# Patient Record
Sex: FEMALE | Race: WHITE | NOT HISPANIC OR LATINO | URBAN - METROPOLITAN AREA
[De-identification: names, ages, dates, MRNs, and addresses within clinical notes are randomized per-mention and may not be internally consistent; named-entity substitution may affect disease eponyms.]

---

## 2018-12-05 ENCOUNTER — OFFICE VISIT (OUTPATIENT)
Dept: SURGERY | Facility: CLINIC | Age: 63
End: 2018-12-05
Payer: COMMERCIAL

## 2018-12-05 VITALS
DIASTOLIC BLOOD PRESSURE: 60 MMHG | WEIGHT: 125 LBS | BODY MASS INDEX: 20.83 KG/M2 | HEIGHT: 65 IN | SYSTOLIC BLOOD PRESSURE: 110 MMHG

## 2018-12-05 DIAGNOSIS — C21.0 ANAL CANCER (CMS/HCC): Primary | ICD-10-CM

## 2018-12-05 PROCEDURE — 99204 OFFICE O/P NEW MOD 45 MIN: CPT | Performed by: SURGERY

## 2018-12-05 RX ORDER — ATOVAQUONE AND PROGUANIL HYDROCHLORIDE 250; 100 MG/1; MG/1
1 TABLET, FILM COATED ORAL
COMMUNITY
Start: 2018-12-02 | End: 2019-06-11

## 2018-12-05 RX ORDER — FLUOXETINE HYDROCHLORIDE 40 MG/1
40 CAPSULE ORAL EVERY MORNING
COMMUNITY

## 2018-12-05 RX ORDER — LORAZEPAM 0.5 MG/1
0.5 TABLET ORAL AS NEEDED
COMMUNITY
Start: 2016-04-26

## 2018-12-05 RX ORDER — ZOLPIDEM TARTRATE 10 MG/1
10 TABLET ORAL AS NEEDED
COMMUNITY
Start: 2016-06-15

## 2018-12-05 RX ORDER — ALBUTEROL SULFATE 90 UG/1
INHALANT RESPIRATORY (INHALATION) AS NEEDED
COMMUNITY
Start: 2016-09-12

## 2018-12-05 RX ORDER — ESTRADIOL 0.5 MG/1
0.5 TABLET ORAL DAILY
COMMUNITY

## 2018-12-05 RX ORDER — TRETINOIN 0.25 MG/G
CREAM TOPICAL
COMMUNITY
Start: 2018-07-09

## 2018-12-05 RX ORDER — AZELASTINE 1 MG/ML
SPRAY, METERED NASAL AS NEEDED
COMMUNITY
Start: 2017-02-15 | End: 2019-06-11

## 2018-12-05 NOTE — LETTER
2018     Cabrera Alonso DO  [address]  [facility] [address]  [facility] [id number]    Patient: Zuleika Barrera   YOB: 1955   Date of Visit: 2018       Dear Dr. Alonso:    Thank you for referring Zuleika Barrera to me for evaluation. Below are my notes for this consultation.    If you have questions, please do not hesitate to call me. I look forward to following your patient along with you.         Sincerely,        Aamir Reeves MD        CC: MD Lala Merino Philip Y, MD  2018  9:48 AM  Sign at close encounter      Chief Complaint:   Chief Complaint   Patient presents with   • Consult     anal cancer   .    HPI     Patient is a 63 y.o. 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     Medical History:   Past Medical History:   Diagnosis Date   • Anal cancer (CMS/HCC) (HCC)        Surgical History:   Past Surgical History:   Procedure Laterality Date   •  SECTION     • HYSTERECTOMY     • TOTAL HIP ARTHROPLASTY         Social History:   Social History     Social History   • Marital status: Single     Spouse name: N/A   • Number of children: N/A   • Years of education: N/A     Occupational History   • Not on file.     Social History Main Topics   • Smoking  status: Never Smoker   • Smokeless tobacco: Never Used   • Alcohol use Yes   • Drug use: No   • Sexual activity: Defer     Other Topics Concern   • Not on file     Social History Narrative   • No narrative on file       Family History:   History reviewed. No pertinent family history.    Allergies:   Patient has no known allergies.    Current Medications:      Current Outpatient Prescriptions:   •  albuterol HFA (PROAIR HFA) 90 mcg/actuation inhaler, INHALE 2 PUFFS EVERY 4 HOURS, Disp: , Rfl:   •  atovaquone-proguanil (MALARONE) 250-100 mg tablet, Take 1 tablet by mouth., Disp: , Rfl:   •  azelastine (ASTELIN) 137 mcg (0.1 %) nasal spray, USE 2 SPRAYS IN EACH NOSTRIL 2 TIMES DAILY AS NEEDED, Disp: , Rfl:   •  estradiol (ESTRACE) 1 mg tablet, Take 0.5 mg by mouth daily., Disp: , Rfl:   •  FLUoxetine (PROzac) 40 mg capsule, Take 40 mg by mouth daily., Disp: , Rfl:   •  LORazepam (ATIVAN) 0.5 mg tablet, , Disp: , Rfl:   •  tretinoin (RETIN-A) 0.025 % cream, APPLY TO CLEAN DRY FACE NIGHTLY, Disp: , Rfl:   •  zolpidem (AMBIEN) 10 mg tablet, , Disp: , Rfl:     Review of Systems  All 14 systems reviewed and the findings are not pertinent to the current problem.    Objective     Vital Signs  Vitals:    12/05/18 0821   BP: 110/60     Body mass index is 20.8 kg/m².      Physicial Exam  General Appearance:  Well developed.  Well nourished.  In no acute distress. Patient was not obese.  Head:  Posture of the head was normal.  Neck:  External features of the neck was normal.  Trachea:  Showed no abnormalities.  Trachea midline.  Extraocular Movements:  Normal.  Pupils:  Reactive to light.   Not deformed.  Oral Cavity:  Mixed dentition was not observed.  Tongue was midline.  Peripheral edema:  Not present.  Genitalia:  Normal.    Anorectal Examination:    No pilonidal sinus was observed.   No pilonidal cyst was observed.   Perianal skin was not erythematous.  No perianal wart was observed.  No anal fissure was observed.   Anus did  not have a fistula.   Anal sphincter tone was normal.   No hemorrhoids were seen.   No external anal skin tags were observed.    Rectum:  No rectal abscess was observed.   Rectal prolapse was not observed.   No rectal fistula was observed.   Rectal exam was not tender.   No rectal fluctuance was observed.  Rectal exam showed no mass.   Normal devon-anal skin with no lesions or dermatitis    She had a firm ulcerated mass in the posterior anal canal that extended to the distal anal verge - rectal exam was not painful but the mass is about 4cm long and occupies 30-40% of the anal canal.      Other Exam:  Musculoskeletal System:  No gross defecits or defects of the upper or lower extremities.  No cyanosis of the fingers.  Lumbar / Lumbosacral Spine:  Lumbar/lumbosacral spine exhibited no abnormalities.  Bilateral thighs:   Posterior aspect was not swollen.   Posterior aspect showed no induration.   Posterior aspect was not erythematous.   Posterior aspect was not warm.   No mass on the posterior aspect.  Functional Exam:   General/bilateral:  Mobility was not limited.  Neurological:   No confusion was observed.   No disorientation was observed.  Speech:  Normal.  Motor:  No tremor was seen.  Balance:  Normal.  Gait And Stance:  Normal.  Psychiatric:  Mood:  Euthymic.  Affect:  Normal.  Skin:  No clubbing of the fingernails.  Normal upper and lower extremity skin exam.      Problem List Items Addressed This Visit     Anal cancer (CMS/HCC) (HCC) - Primary     This nice lady has a brand-new diagnosis of anal squamous cell carcinoma that was found several weeks ago during a colonoscopy.  On exam she has a hard mass in the posterior anus which is clearly invading the muscles.  She is symptomatic and has pain and bleeding.  My plan is a referral to oncology for radiation and chemotherapy and I will see her after the termination of her treatment so that I can reevaluate the anal canal and the rectum in the operating room about  3 months from termination of treatment.                   Aamir Reeves MD 12/5/2018 9:47 AM

## 2018-12-05 NOTE — PATIENT INSTRUCTIONS
Anal Cancer      Overview  Anal cancer is an uncommon type of cancer that occurs in the anal canal. The anal canal is a short tube at the end of your rectum through which stool leaves your body.  Anal cancer can cause signs and symptoms such as rectal bleeding and anal pain.  Most people with anal cancer are treated with a combination of chemotherapy and radiation. Though combining anal cancer treatments increases the chance of a cure, the combined treatments also increase the risk of side effects.    Signs and symptoms  - Bleeding from the anus or rectum  - Pain in the area of the anus  - A mass or growth in the anal canal  - Anal itching    Relationship to HPV  Anal cancer is closely related to a sexually transmitted infection called human papillomavirus (HPV). Evidence of HPV is detected in the majority of anal cancers. HPV is thought to be the most common cause of anal cancers.    Risk Factors  - Older age. Most cases of anal cancer occur in people age 50 and older.  - Many sexual partners. Men and women who have many sexual partners over their lifetimes have a greater risk of anal cancer.  - Smoking. Smoking cigarettes may increase your risk of anal cancer.  - Human papillomavirus (HPV). HPV infection increases your risk of several cancers, including anal cancer and cervical cancer. HPV infection is a sexually transmitted infection that can also cause genital warts.  - Drugs or conditions that suppress your immune system. People who take drugs to suppress their immune systems (immunosuppressive drugs), including people who have received organ transplants, may have an increased risk of anal cancer. HIV -- the virus that causes AIDS -- suppresses the immune system and increases the risk of anal cancer.    Metastatic Spread  Anal cancer rarely spreads (metastasizes) to distant parts of the body. Only a small percentage of tumors are found to have spread, but those that do are especially difficult to treat. Anal  cancer that metastasizes most commonly spreads to the liver and the lungs.    Treatment  What treatment you receive for anal cancer depends on the stage of your cancer, your overall health and your own preferences.    If your anal cancer is very small, it may be removed using surgery alone, however doctors usually treat anal cancer with a combination of chemotherapy and radiation. Combined, these two treatments enhance each other and improve chances for a cure.    Chemotherapy drugs are injected into a vein or taken as pills. The chemicals travel throughout your body, killing rapidly growing cells, such as cancer cells. Unfortunately they also damage healthy cells that grow rapidly, including those in your gastrointestinal tract and in your hair follicles. This causes side effects such as nausea, vomiting and hair loss.    Radiation therapy uses high-powered beams, such as X-rays, to kill cancer cells. During radiation therapy, you're positioned on a table and a large machine moves around you, directing radiation beams to specific areas of your body in order to target your cancer. Radiation may damage healthy tissue near where the beams are aimed. Side effects may include skin redness and sores in and around your anus, as well as hardening and shrinking of your anal canal.    You typically undergo radiation therapy for anal cancer for five or six weeks. Chemotherapy is typically administered during the first week and the fifth week. Your doctor tailors your treatment schedule based on characteristics of your cancer and your overall health. Though combining chemotherapy and radiation increases the effectiveness of the two treatments, it also makes side effects more likely.

## 2018-12-05 NOTE — PROGRESS NOTES
Chief Complaint:   Chief Complaint   Patient presents with   • Consult     anal cancer   .    HPI     Patient is a 63 y.o. 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 and she wanted another opinion    She also had pain back there - was painful to ride bike - pt has never had anal sex -     Pt had hysterectomy 11 years ago - done for uterine and bladder prolapse -     Turns out mass is 4-5 cm long and 2-3 cm wide - this was done 18 -     CT from last week shows abdomen / pelvis with no mets - needs chest CT probably    Never had a positive PAP smear -     .     Medical History:   Past Medical History:   Diagnosis Date   • Anal cancer (CMS/HCC) (HCC)        Surgical History:   Past Surgical History:   Procedure Laterality Date   •  SECTION     • HYSTERECTOMY     • TOTAL HIP ARTHROPLASTY         Social History:   Social History     Social History   • Marital status: Single     Spouse name: N/A   • Number of children: N/A   • Years of education: N/A     Occupational History   • Not on file.     Social History Main Topics   • Smoking status: Never Smoker   • Smokeless tobacco: Never Used   • Alcohol use Yes   • Drug use: No   • Sexual activity: Defer     Other Topics Concern   • Not on file     Social History Narrative   • No narrative on file       Family History:   History reviewed. No pertinent family history.    Allergies:   Patient has no known allergies.    Current Medications:      Current Outpatient Prescriptions:   •  albuterol HFA (PROAIR HFA) 90 mcg/actuation inhaler, INHALE 2 PUFFS EVERY 4 HOURS, Disp: , Rfl:   •  atovaquone-proguanil (MALARONE) 250-100 mg tablet, Take 1 tablet by  mouth., Disp: , Rfl:   •  azelastine (ASTELIN) 137 mcg (0.1 %) nasal spray, USE 2 SPRAYS IN EACH NOSTRIL 2 TIMES DAILY AS NEEDED, Disp: , Rfl:   •  estradiol (ESTRACE) 1 mg tablet, Take 0.5 mg by mouth daily., Disp: , Rfl:   •  FLUoxetine (PROzac) 40 mg capsule, Take 40 mg by mouth daily., Disp: , Rfl:   •  LORazepam (ATIVAN) 0.5 mg tablet, , Disp: , Rfl:   •  tretinoin (RETIN-A) 0.025 % cream, APPLY TO CLEAN DRY FACE NIGHTLY, Disp: , Rfl:   •  zolpidem (AMBIEN) 10 mg tablet, , Disp: , Rfl:     Review of Systems  All 14 systems reviewed and the findings are not pertinent to the current problem.    Objective     Vital Signs  Vitals:    12/05/18 0821   BP: 110/60     Body mass index is 20.8 kg/m².      Physicial Exam  General Appearance:  Well developed.  Well nourished.  In no acute distress. Patient was not obese.  Head:  Posture of the head was normal.  Neck:  External features of the neck was normal.  Trachea:  Showed no abnormalities.  Trachea midline.  Extraocular Movements:  Normal.  Pupils:  Reactive to light.   Not deformed.  Oral Cavity:  Mixed dentition was not observed.  Tongue was midline.  Peripheral edema:  Not present.  Genitalia:  Normal.    Anorectal Examination:    No pilonidal sinus was observed.   No pilonidal cyst was observed.   Perianal skin was not erythematous.  No perianal wart was observed.  No anal fissure was observed.   Anus did not have a fistula.   Anal sphincter tone was normal.   No hemorrhoids were seen.   No external anal skin tags were observed.    Rectum:  No rectal abscess was observed.   Rectal prolapse was not observed.   No rectal fistula was observed.   Rectal exam was not tender.   No rectal fluctuance was observed.  Rectal exam showed no mass.   Normal devon-anal skin with no lesions or dermatitis    She had a firm ulcerated mass in the posterior anal canal that extended to the distal anal verge - rectal exam was not painful but the mass is about 4cm long and occupies  30-40% of the anal canal.      Other Exam:  Musculoskeletal System:  No gross defecits or defects of the upper or lower extremities.  No cyanosis of the fingers.  Lumbar / Lumbosacral Spine:  Lumbar/lumbosacral spine exhibited no abnormalities.  Bilateral thighs:   Posterior aspect was not swollen.   Posterior aspect showed no induration.   Posterior aspect was not erythematous.   Posterior aspect was not warm.   No mass on the posterior aspect.  Functional Exam:   General/bilateral:  Mobility was not limited.  Neurological:   No confusion was observed.   No disorientation was observed.  Speech:  Normal.  Motor:  No tremor was seen.  Balance:  Normal.  Gait And Stance:  Normal.  Psychiatric:  Mood:  Euthymic.  Affect:  Normal.  Skin:  No clubbing of the fingernails.  Normal upper and lower extremity skin exam.      Problem List Items Addressed This Visit     Anal cancer (CMS/HCC) (HCC) - Primary     This nice lady has a brand-new diagnosis of anal squamous cell carcinoma that was found several weeks ago during a colonoscopy.  On exam she has a hard mass in the posterior anus which is clearly invading the muscles.  She is symptomatic and has pain and bleeding.  My plan is a referral to oncology for radiation and chemotherapy and I will see her after the termination of her treatment so that I can reevaluate the anal canal and the rectum in the operating room about 3 months from termination of treatment.                   Aamir Reeves MD 12/5/2018 9:47 AM

## 2018-12-05 NOTE — ASSESSMENT & PLAN NOTE
This nice lady has a brand-new diagnosis of anal squamous cell carcinoma that was found several weeks ago during a colonoscopy.  On exam she has a hard mass in the posterior anus which is clearly invading the muscles.  She is symptomatic and has pain and bleeding.  My plan is a referral to oncology for radiation and chemotherapy and I will see her after the termination of her treatment so that I can reevaluate the anal canal and the rectum in the operating room about 3 months from termination of treatment.

## 2018-12-06 ENCOUNTER — LAB REQUISITION (OUTPATIENT)
Dept: LAB | Facility: HOSPITAL | Age: 63
End: 2018-12-06
Payer: COMMERCIAL

## 2018-12-06 DIAGNOSIS — C21.0 MALIGNANT NEOPLASM OF ANUS (CMS/HCC): ICD-10-CM

## 2018-12-06 LAB
BASOPHILS # BLD: 0.03 K/UL (ref 0.01–0.1)
BASOPHILS NFR BLD: 0.7 %
DIFFERENTIAL METHOD BLD: ABNORMAL
EOSINOPHIL # BLD: 0.03 K/UL (ref 0.04–0.36)
EOSINOPHIL NFR BLD: 0.7 %
ERYTHROCYTE [DISTWIDTH] IN BLOOD BY AUTOMATED COUNT: 11.6 % (ref 11.7–14.4)
HCT VFR BLDCO AUTO: 34.9 %
HGB BLD-MCNC: 11.7 G/DL
IMM GRANULOCYTES # BLD AUTO: 0.01 K/UL (ref 0–0.08)
IMM GRANULOCYTES NFR BLD AUTO: 0.2 %
LYMPHOCYTES # BLD: 1.69 K/UL (ref 1.2–3.5)
LYMPHOCYTES NFR BLD: 41.1 %
MCH RBC QN AUTO: 31.6 PG (ref 28–33.2)
MCHC RBC AUTO-ENTMCNC: 33.5 G/DL (ref 32.2–35.5)
MCV RBC AUTO: 94.3 FL (ref 83–98)
MONOCYTES # BLD: 0.29 K/UL (ref 0.28–0.8)
MONOCYTES NFR BLD: 7.1 %
NEUTROPHILS # BLD: 2.06 K/UL (ref 1.7–7)
NEUTS SEG NFR BLD: 50.2 %
NRBC BLD-RTO: 0 %
PDW BLD AUTO: 9.8 FL (ref 9.4–12.3)
PLATELET # BLD AUTO: 259 K/UL
RBC # BLD AUTO: 3.7 M/UL (ref 3.93–5.22)
WBC # BLD AUTO: 4.11 K/UL

## 2018-12-06 PROCEDURE — 36415 COLL VENOUS BLD VENIPUNCTURE: CPT | Performed by: INTERNAL MEDICINE

## 2018-12-06 PROCEDURE — 85025 COMPLETE CBC W/AUTO DIFF WBC: CPT | Performed by: INTERNAL MEDICINE

## 2018-12-07 ENCOUNTER — TRANSCRIBE ORDERS (OUTPATIENT)
Dept: SCHEDULING | Age: 63
End: 2018-12-07

## 2018-12-07 DIAGNOSIS — C21.0 MALIGNANT NEOPLASM OF ANUS (CMS/HCC): Primary | ICD-10-CM

## 2018-12-19 ENCOUNTER — HOSPITAL ENCOUNTER (OUTPATIENT)
Dept: RADIOLOGY | Age: 63
Discharge: HOME | End: 2018-12-19
Attending: INTERNAL MEDICINE
Payer: COMMERCIAL

## 2018-12-19 VITALS — WEIGHT: 127 LBS | BODY MASS INDEX: 21.16 KG/M2 | HEIGHT: 65 IN

## 2018-12-19 DIAGNOSIS — C21.0 MALIGNANT NEOPLASM OF ANUS (CMS/HCC): ICD-10-CM

## 2018-12-19 PROCEDURE — 63600105 HC IODINE BASED CONTRAST: Mod: JW | Performed by: INTERNAL MEDICINE

## 2018-12-19 PROCEDURE — 71260 CT THORAX DX C+: CPT

## 2018-12-19 RX ORDER — FLUDEOXYGLUCOSE F18 300 MCI/ML
9.44 INJECTION INTRAVENOUS ONCE
Status: COMPLETED | OUTPATIENT
Start: 2018-12-19 | End: 2018-12-19

## 2018-12-19 RX ADMIN — FLUDEOXYGLUCOSE F18 9.44 MILLICURIE: 300 INJECTION INTRAVENOUS at 07:37

## 2018-12-19 RX ADMIN — IOHEXOL 75 ML: 350 INJECTION, SOLUTION INTRAVENOUS at 10:44

## 2019-05-07 ENCOUNTER — OFFICE VISIT (OUTPATIENT)
Dept: SURGERY | Facility: CLINIC | Age: 64
End: 2019-05-07
Payer: COMMERCIAL

## 2019-05-07 VITALS
HEIGHT: 65 IN | DIASTOLIC BLOOD PRESSURE: 70 MMHG | SYSTOLIC BLOOD PRESSURE: 120 MMHG | WEIGHT: 125 LBS | BODY MASS INDEX: 20.83 KG/M2

## 2019-05-07 DIAGNOSIS — C21.0 ANAL CANCER (CMS/HCC): Primary | ICD-10-CM

## 2019-05-07 PROCEDURE — 99213 OFFICE O/P EST LOW 20 MIN: CPT | Performed by: SURGERY

## 2019-05-07 RX ORDER — TRIAMCINOLONE ACETONIDE 55 UG/1
2 SPRAY, METERED NASAL AS NEEDED
COMMUNITY
Start: 2015-05-13

## 2019-05-07 RX ORDER — SODIUM CHLORIDE 9 MG/ML
INJECTION, SOLUTION INTRAVENOUS CONTINUOUS
Status: CANCELLED | OUTPATIENT
Start: 2019-05-07 | End: 2019-05-08

## 2019-05-07 NOTE — PROGRESS NOTES
Chief Complaint:   Chief Complaint   Patient presents with   • Follow-up     Anal Cancer   .    HPI     Patient is a 63 y.o. female who presents with the following:  Pt has 4cm posterior anal mass found on scope  - I saw her  as well and examined her in the office - had rads / chemo for this porrly diff anal squamous CA -     Dr. Ruiz - rads  Dr. Kaiser - chemo    5 weeks of RX -     Radiation was rough - RX ended last week of 2019  - .     Medical History:   Past Medical History:   Diagnosis Date   • Anal cancer (CMS/HCC) (HCC)        Surgical History:   Past Surgical History:   Procedure Laterality Date   •  SECTION     • HYSTERECTOMY     • TOTAL HIP ARTHROPLASTY         Social History:   Social History     Social History   • Marital status: Single     Spouse name: N/A   • Number of children: N/A   • Years of education: N/A     Occupational History   • Not on file.     Social History Main Topics   • Smoking status: Never Smoker   • Smokeless tobacco: Never Used   • Alcohol use Yes   • Drug use: No   • Sexual activity: Defer     Other Topics Concern   • Not on file     Social History Narrative   • No narrative on file       Family History:   History reviewed. No pertinent family history.    Allergies:   Hydromorphone    Current Medications:      Current Outpatient Prescriptions:   •  albuterol HFA (PROAIR HFA) 90 mcg/actuation inhaler, as needed. , Disp: , Rfl:   •  azelastine (ASTELIN) 137 mcg (0.1 %) nasal spray, as needed. , Disp: , Rfl:   •  estradiol (ESTRACE) 1 mg tablet, Take 0.5 mg by mouth daily., Disp: , Rfl:   •  FLUoxetine (PROzac) 40 mg capsule, Take 40 mg by mouth daily., Disp: , Rfl:   •  ibuprofen (MOTRIN) 200 mg tablet, Take 200 mg by mouth as needed., Disp: , Rfl:   •  LORazepam (ATIVAN) 0.5 mg tablet, as needed. , Disp: , Rfl:   •  tretinoin (RETIN-A) 0.025 % cream, APPLY TO CLEAN DRY FACE NIGHTLY, Disp: , Rfl:   •  triamcinolone (NASACORT) 55 mcg nasal inhaler, 2  sprays daily., Disp: , Rfl:   •  zolpidem (AMBIEN) 10 mg tablet, as needed. , Disp: , Rfl:   •  atovaquone-proguanil (MALARONE) 250-100 mg tablet, Take 1 tablet by mouth., Disp: , Rfl:     Review of Systems  All 14 systems reviewed and the findings are not pertinent to the current problem.    Objective     Vital Signs  Vitals:    05/07/19 1029   BP: 120/70     Body mass index is 20.8 kg/m².      Physicial Exam    General Appearance:    Well developed.  Well nourished.  In no acute distress.  Patient was not observed to be obese.  Head:  Posture of the head was normal.  Neck:  External appearance of the neck was normal.  Trachea:  Showed no abnormalities.  Trachea was midline.  Eyes:  Extraocular movements were normal.  Pupils:  Reactive to light.  Not deformed.  Oral Cavity:  Mixed dentition was not observed.  Tongue was midline.    Other:  Musculoskeletal System:  No deficits or defects in the upper or lower extremities.    Functional Exam:   General/bilateral: Mobility was not limited.  Neurological:  No confusion was observed.  No disorientation was observed.  Speech: Normal.  Motor: No tremor was seen.  Balance: Normal.  Gait And Stance: Normal.  Psychiatric:  Mood:  Euthymic.  Affect:  Normal.  Skin:  General appearance was normal.  No jaundice.  Nails:  No clubbing of the fingernails.        Problem List Items Addressed This Visit     Anal cancer (CMS/HCC) (HCC) - Primary     The patient completed her radiation and chemo for anal cancer at the end of March 2019.  My plan is a trip to the OR in June 2019 for an exam with possible biopsies of the area.                   Aamir Reeves MD 5/7/2019 10:59 AM

## 2019-05-07 NOTE — ASSESSMENT & PLAN NOTE
The patient completed her radiation and chemo for anal cancer at the end of March 2019.  My plan is a trip to the OR in June 2019 for an exam with possible biopsies of the area.

## 2019-05-07 NOTE — LETTER
May 7, 2019     Cabrera Alonso DO  [address]  [facility] [address]  [place] [id number]    Patient: Zuleika Barrera   YOB: 1955   Date of Visit: 2019       Dear Dr. Alonso:    Thank you for referring Zuleika Barrera to me for evaluation. Below are my notes for this consultation.    If you have questions, please do not hesitate to call me. I look forward to following your patient along with you.         Sincerely,        Aamir Reeves MD        CC: MD Mihir Pratt MD Pearson, Philip Y, MD  2019 10:59 AM  Sign at close encounter      Chief Complaint:   Chief Complaint   Patient presents with   • Follow-up     Anal Cancer   .    HPI     Patient is a 63 y.o. female who presents with the following:  Pt has 4cm posterior anal mass found on scope  - I saw her  as well and examined her in the office - had rads / chemo for this porrly diff anal squamous CA -     Dr. Ruiz - rads  Dr. Kaiser - chemo    5 weeks of RX -     Radiation was rough - RX ended last week of 2019  - .     Medical History:   Past Medical History:   Diagnosis Date   • Anal cancer (CMS/HCC) (HCC)        Surgical History:   Past Surgical History:   Procedure Laterality Date   •  SECTION     • HYSTERECTOMY     • TOTAL HIP ARTHROPLASTY         Social History:   Social History     Social History   • Marital status: Single     Spouse name: N/A   • Number of children: N/A   • Years of education: N/A     Occupational History   • Not on file.     Social History Main Topics   • Smoking status: Never Smoker   • Smokeless tobacco: Never Used   • Alcohol use Yes   • Drug use: No   • Sexual activity: Defer     Other Topics Concern   • Not on file     Social History Narrative   • No narrative on file       Family History:   History reviewed. No pertinent family history.    Allergies:   Hydromorphone    Current Medications:      Current Outpatient Prescriptions:   •  albuterol HFA (PROAIR  HFA) 90 mcg/actuation inhaler, as needed. , Disp: , Rfl:   •  azelastine (ASTELIN) 137 mcg (0.1 %) nasal spray, as needed. , Disp: , Rfl:   •  estradiol (ESTRACE) 1 mg tablet, Take 0.5 mg by mouth daily., Disp: , Rfl:   •  FLUoxetine (PROzac) 40 mg capsule, Take 40 mg by mouth daily., Disp: , Rfl:   •  ibuprofen (MOTRIN) 200 mg tablet, Take 200 mg by mouth as needed., Disp: , Rfl:   •  LORazepam (ATIVAN) 0.5 mg tablet, as needed. , Disp: , Rfl:   •  tretinoin (RETIN-A) 0.025 % cream, APPLY TO CLEAN DRY FACE NIGHTLY, Disp: , Rfl:   •  triamcinolone (NASACORT) 55 mcg nasal inhaler, 2 sprays daily., Disp: , Rfl:   •  zolpidem (AMBIEN) 10 mg tablet, as needed. , Disp: , Rfl:   •  atovaquone-proguanil (MALARONE) 250-100 mg tablet, Take 1 tablet by mouth., Disp: , Rfl:     Review of Systems  All 14 systems reviewed and the findings are not pertinent to the current problem.    Objective     Vital Signs  Vitals:    05/07/19 1029   BP: 120/70     Body mass index is 20.8 kg/m².      Physicial Exam    General Appearance:    Well developed.  Well nourished.  In no acute distress.  Patient was not observed to be obese.  Head:  Posture of the head was normal.  Neck:  External appearance of the neck was normal.  Trachea:  Showed no abnormalities.  Trachea was midline.  Eyes:  Extraocular movements were normal.  Pupils:  Reactive to light.  Not deformed.  Oral Cavity:  Mixed dentition was not observed.  Tongue was midline.    Other:  Musculoskeletal System:  No deficits or defects in the upper or lower extremities.    Functional Exam:   General/bilateral: Mobility was not limited.  Neurological:  No confusion was observed.  No disorientation was observed.  Speech: Normal.  Motor: No tremor was seen.  Balance: Normal.  Gait And Stance: Normal.  Psychiatric:  Mood:  Euthymic.  Affect:  Normal.  Skin:  General appearance was normal.  No jaundice.  Nails:  No clubbing of the fingernails.        Problem List Items Addressed This Visit      Anal cancer (CMS/HCC) (HCC) - Primary     The patient completed her radiation and chemo for anal cancer at the end of March 2019.  My plan is a trip to the OR in June 2019 for an exam with possible biopsies of the area.                   Aamir Reeves MD 5/7/2019 10:59 AM

## 2019-06-10 PROCEDURE — 200200 PR NO CHARGE: Performed by: SURGERY

## 2019-06-10 NOTE — H&P
Chief Complaint:   Chief Complaint   Patient presents with   • Follow-up       Anal Cancer   .        HPI        Patient is a 63 y.o. female who presents with the following:  Pt has 4cm posterior anal mass found on scope  - I saw her  as well and examined her in the office - had rads / chemo for this porrly diff anal squamous CA -      Dr. Ruiz - rads  Dr. Kaiser - chemo     5 weeks of RX -      Radiation was rough - RX ended last week of 2019  - .      Medical History:   Medical History        Past Medical History:   Diagnosis Date   • Anal cancer (CMS/HCC) (HCC)              Surgical History:   Surgical History         Past Surgical History:   Procedure Laterality Date   •  SECTION      • HYSTERECTOMY       • TOTAL HIP ARTHROPLASTY                Social History:   Social History   Social History            Social History   • Marital status: Single       Spouse name: N/A   • Number of children: N/A   • Years of education: N/A          Occupational History   • Not on file.           Social History Main Topics   • Smoking status: Never Smoker   • Smokeless tobacco: Never Used   • Alcohol use Yes   • Drug use: No   • Sexual activity: Defer           Other Topics Concern   • Not on file          Social History Narrative   • No narrative on file            Family History:   History reviewed. No pertinent family history.     Allergies:   Hydromorphone     Current Medications:       Current Outpatient Prescriptions:   •  albuterol HFA (PROAIR HFA) 90 mcg/actuation inhaler, as needed. , Disp: , Rfl:   •  azelastine (ASTELIN) 137 mcg (0.1 %) nasal spray, as needed. , Disp: , Rfl:   •  estradiol (ESTRACE) 1 mg tablet, Take 0.5 mg by mouth daily., Disp: , Rfl:   •  FLUoxetine (PROzac) 40 mg capsule, Take 40 mg by mouth daily., Disp: , Rfl:   •  ibuprofen (MOTRIN) 200 mg tablet, Take 200 mg by mouth as needed., Disp: , Rfl:   •  LORazepam (ATIVAN) 0.5 mg tablet, as needed. , Disp: , Rfl:   •   tretinoin (RETIN-A) 0.025 % cream, APPLY TO CLEAN DRY FACE NIGHTLY, Disp: , Rfl:   •  triamcinolone (NASACORT) 55 mcg nasal inhaler, 2 sprays daily., Disp: , Rfl:   •  zolpidem (AMBIEN) 10 mg tablet, as needed. , Disp: , Rfl:   •  atovaquone-proguanil (MALARONE) 250-100 mg tablet, Take 1 tablet by mouth., Disp: , Rfl:      Review of Systems  All 14 systems reviewed and the findings are not pertinent to the current problem.        Objective         Vital Signs      Vitals:     05/07/19 1029   BP: 120/70      Body mass index is 20.8 kg/m².        Physicial Exam     General Appearance:    Well developed.  Well nourished.  In no acute distress.  Patient was not observed to be obese.  Head:  Posture of the head was normal.  Neck:  External appearance of the neck was normal.  Trachea:  Showed no abnormalities.  Trachea was midline.  Eyes:  Extraocular movements were normal.  Pupils:  Reactive to light.  Not deformed.  Oral Cavity:  Mixed dentition was not observed.  Tongue was midline.     Other:  Musculoskeletal System:  No deficits or defects in the upper or lower extremities.    Functional Exam:   General/bilateral: Mobility was not limited.  Neurological:  No confusion was observed.  No disorientation was observed.  Speech: Normal.  Motor: No tremor was seen.  Balance: Normal.  Gait And Stance: Normal.  Psychiatric:  Mood:  Euthymic.  Affect:  Normal.  Skin:  General appearance was normal.  No jaundice.  Nails:  No clubbing of the fingernails.               Problem List Items Addressed This Visit      Anal cancer (CMS/HCC) (HCC) - Primary       The patient completed her radiation and chemo for anal cancer at the end of March 2019.  My plan is a trip to the OR in June 2019 for an exam with possible biopsies of the area.

## 2019-06-11 ENCOUNTER — APPOINTMENT (OUTPATIENT)
Dept: LAB | Facility: HOSPITAL | Age: 64
End: 2019-06-11
Attending: SURGERY
Payer: COMMERCIAL

## 2019-06-11 ENCOUNTER — HOSPITAL ENCOUNTER (OUTPATIENT)
Dept: CARDIOLOGY | Facility: HOSPITAL | Age: 64
Discharge: HOME | End: 2019-06-11
Attending: SURGERY
Payer: COMMERCIAL

## 2019-06-11 DIAGNOSIS — C21.0 ANAL CANCER (CMS/HCC): ICD-10-CM

## 2019-06-11 LAB
ANION GAP SERPL CALC-SCNC: 7 MEQ/L (ref 3–15)
ATRIAL RATE: 60
BASOPHILS # BLD: 0.02 K/UL (ref 0.01–0.1)
BASOPHILS NFR BLD: 1 %
BUN SERPL-MCNC: 14 MG/DL (ref 8–20)
CALCIUM SERPL-MCNC: 9.4 MG/DL (ref 8.9–10.3)
CHLORIDE SERPL-SCNC: 103 MEQ/L (ref 98–109)
CO2 SERPL-SCNC: 28 MEQ/L (ref 22–32)
CREAT SERPL-MCNC: 0.9 MG/DL
DACRYOCYTES BLD QL SMEAR: ABNORMAL
DIFFERENTIAL METHOD BLD: ABNORMAL
EOSINOPHIL # BLD: 0.05 K/UL (ref 0.04–0.36)
EOSINOPHIL NFR BLD: 3 %
ERYTHROCYTE [DISTWIDTH] IN BLOOD BY AUTOMATED COUNT: 12 % (ref 11.7–14.4)
GFR SERPL CREATININE-BSD FRML MDRD: >60 ML/MIN/1.73M*2
GLUCOSE SERPL-MCNC: 91 MG/DL (ref 70–99)
HCT VFR BLDCO AUTO: 35.9 %
HGB BLD-MCNC: 11.7 G/DL
LYMPHOCYTES # BLD: 0.39 K/UL (ref 1.2–3.5)
LYMPHOCYTES NFR BLD: 22 %
MCH RBC QN AUTO: 32.3 PG (ref 28–33.2)
MCHC RBC AUTO-ENTMCNC: 32.6 G/DL (ref 32.2–35.5)
MCV RBC AUTO: 99.2 FL (ref 83–98)
MONOCYTES # BLD: 0.16 K/UL (ref 0.28–0.8)
MONOCYTES NFR BLD: 9 %
NEUTS BAND # BLD: 0.04 K/UL (ref 0–0.53)
NEUTS BAND # BLD: 1.1 K/UL (ref 1.7–7)
NEUTS BAND NFR BLD: 2 %
NEUTS SEG NFR BLD: 63 %
P AXIS: 74
PDW BLD AUTO: 10.2 FL (ref 9.4–12.3)
PLAT MORPH BLD: NORMAL
PLATELET # BLD AUTO: 172 K/UL
PLATELET # BLD EST: ABNORMAL 10*3/UL
POTASSIUM SERPL-SCNC: 3.9 MEQ/L (ref 3.6–5.1)
PR INTERVAL: 128
QRS DURATION: 90
QT INTERVAL: 416
QTC CALCULATION(BAZETT): 416
R AXIS: 51
RBC # BLD AUTO: 3.62 M/UL (ref 3.93–5.22)
SODIUM SERPL-SCNC: 138 MEQ/L (ref 136–144)
T WAVE AXIS: 68
VENTRICULAR RATE: 60
WBC # BLD AUTO: 1.75 K/UL

## 2019-06-11 PROCEDURE — 85025 COMPLETE CBC W/AUTO DIFF WBC: CPT

## 2019-06-11 PROCEDURE — 80048 BASIC METABOLIC PNL TOTAL CA: CPT

## 2019-06-11 PROCEDURE — 36415 COLL VENOUS BLD VENIPUNCTURE: CPT

## 2019-06-11 PROCEDURE — 93005 ELECTROCARDIOGRAM TRACING: CPT

## 2019-06-12 LAB — PATH REV BLD -IMP: NORMAL

## 2019-06-14 ENCOUNTER — HOSPITAL ENCOUNTER (OUTPATIENT)
Facility: HOSPITAL | Age: 64
Setting detail: HOSPITAL OUTPATIENT SURGERY
Discharge: HOME | End: 2019-06-14
Attending: SURGERY | Admitting: SURGERY
Payer: COMMERCIAL

## 2019-06-14 ENCOUNTER — ANESTHESIA EVENT (OUTPATIENT)
Dept: OPERATING ROOM | Facility: HOSPITAL | Age: 64
Setting detail: HOSPITAL OUTPATIENT SURGERY
End: 2019-06-14
Payer: COMMERCIAL

## 2019-06-14 VITALS
TEMPERATURE: 98.5 F | OXYGEN SATURATION: 100 % | BODY MASS INDEX: 20.83 KG/M2 | WEIGHT: 125 LBS | DIASTOLIC BLOOD PRESSURE: 64 MMHG | RESPIRATION RATE: 14 BRPM | SYSTOLIC BLOOD PRESSURE: 106 MMHG | HEART RATE: 68 BPM | HEIGHT: 65 IN

## 2019-06-14 DIAGNOSIS — C21.0 ANAL CANCER (CMS/HCC): ICD-10-CM

## 2019-06-14 PROCEDURE — 36000003 HC OR LEVEL 3 INITIAL 30MIN: Performed by: SURGERY

## 2019-06-14 PROCEDURE — 0DBQ7ZX EXCISION OF ANUS, VIA NATURAL OR ARTIFICIAL OPENING, DIAGNOSTIC: ICD-10-PCS | Performed by: SURGERY

## 2019-06-14 PROCEDURE — 27200000 HC STERILE SUPPLY: Performed by: SURGERY

## 2019-06-14 PROCEDURE — 88304 TISSUE EXAM BY PATHOLOGIST: CPT | Performed by: SURGERY

## 2019-06-14 PROCEDURE — 63600000 HC DRUGS/DETAIL CODE: Performed by: ANESTHESIOLOGY

## 2019-06-14 PROCEDURE — 63700000 HC SELF-ADMINISTRABLE DRUG: Performed by: ANESTHESIOLOGY

## 2019-06-14 PROCEDURE — 25800000 HC PHARMACY IV SOLUTIONS: Performed by: SURGERY

## 2019-06-14 PROCEDURE — 46922 EXCISION OF ANAL LESION(S): CPT | Performed by: SURGERY

## 2019-06-14 PROCEDURE — 25000000 HC PHARMACY GENERAL: Performed by: SURGERY

## 2019-06-14 PROCEDURE — 71000002 HC PACU PHASE 2 INITIAL 30MIN: Performed by: SURGERY

## 2019-06-14 PROCEDURE — 37000001 HC ANESTHESIA GENERAL: Performed by: SURGERY

## 2019-06-14 PROCEDURE — 63700000 HC SELF-ADMINISTRABLE DRUG: Performed by: SURGERY

## 2019-06-14 PROCEDURE — 71000001 HC PACU PHASE 1 INITIAL 30MIN: Performed by: SURGERY

## 2019-06-14 PROCEDURE — 71000011 HC PACU PHASE 1 EA ADDL MIN: Performed by: SURGERY

## 2019-06-14 RX ORDER — MORPHINE SULFATE 4 MG/ML
1-2 INJECTION, SOLUTION INTRAMUSCULAR; INTRAVENOUS
Status: DISCONTINUED | OUTPATIENT
Start: 2019-06-14 | End: 2019-06-14 | Stop reason: HOSPADM

## 2019-06-14 RX ORDER — OXYCODONE HYDROCHLORIDE 5 MG/1
5-10 TABLET ORAL EVERY 4 HOURS PRN
Status: DISCONTINUED | OUTPATIENT
Start: 2019-06-14 | End: 2019-06-14 | Stop reason: HOSPADM

## 2019-06-14 RX ORDER — SODIUM CHLORIDE 9 MG/ML
INJECTION, SOLUTION INTRAVENOUS CONTINUOUS
Status: DISCONTINUED | OUTPATIENT
Start: 2019-06-14 | End: 2019-06-14 | Stop reason: HOSPADM

## 2019-06-14 RX ORDER — DEXAMETHASONE SODIUM PHOSPHATE 4 MG/ML
INJECTION, SOLUTION INTRA-ARTICULAR; INTRALESIONAL; INTRAMUSCULAR; INTRAVENOUS; SOFT TISSUE AS NEEDED
Status: DISCONTINUED | OUTPATIENT
Start: 2019-06-14 | End: 2019-06-14 | Stop reason: SURG

## 2019-06-14 RX ORDER — BUPIVACAINE HYDROCHLORIDE AND EPINEPHRINE 2.5; 5 MG/ML; UG/ML
INJECTION, SOLUTION EPIDURAL; INFILTRATION; INTRACAUDAL; PERINEURAL AS NEEDED
Status: DISCONTINUED | OUTPATIENT
Start: 2019-06-14 | End: 2019-06-14 | Stop reason: HOSPADM

## 2019-06-14 RX ORDER — SODIUM CHLORIDE 9 MG/ML
1000 INJECTION, SOLUTION INTRAVENOUS CONTINUOUS
Status: DISCONTINUED | OUTPATIENT
Start: 2019-06-14 | End: 2019-06-14 | Stop reason: HOSPADM

## 2019-06-14 RX ORDER — PROPOFOL 10 MG/ML
INJECTION, EMULSION INTRAVENOUS AS NEEDED
Status: DISCONTINUED | OUTPATIENT
Start: 2019-06-14 | End: 2019-06-14 | Stop reason: SURG

## 2019-06-14 RX ORDER — KETOROLAC TROMETHAMINE 30 MG/ML
INJECTION, SOLUTION INTRAMUSCULAR; INTRAVENOUS AS NEEDED
Status: DISCONTINUED | OUTPATIENT
Start: 2019-06-14 | End: 2019-06-14 | Stop reason: SURG

## 2019-06-14 RX ORDER — ONDANSETRON HYDROCHLORIDE 2 MG/ML
4 INJECTION, SOLUTION INTRAVENOUS
Status: DISCONTINUED | OUTPATIENT
Start: 2019-06-14 | End: 2019-06-14 | Stop reason: HOSPADM

## 2019-06-14 RX ORDER — PROPOFOL 10 MG/ML
INJECTION, EMULSION INTRAVENOUS CONTINUOUS PRN
Status: DISCONTINUED | OUTPATIENT
Start: 2019-06-14 | End: 2019-06-14

## 2019-06-14 RX ORDER — FENTANYL CITRATE 50 UG/ML
50 INJECTION, SOLUTION INTRAMUSCULAR; INTRAVENOUS
Status: DISCONTINUED | OUTPATIENT
Start: 2019-06-14 | End: 2019-06-14 | Stop reason: HOSPADM

## 2019-06-14 RX ORDER — ONDANSETRON HYDROCHLORIDE 2 MG/ML
INJECTION, SOLUTION INTRAVENOUS AS NEEDED
Status: DISCONTINUED | OUTPATIENT
Start: 2019-06-14 | End: 2019-06-14 | Stop reason: SURG

## 2019-06-14 RX ORDER — MEPERIDINE HYDROCHLORIDE 25 MG/ML
12.5 INJECTION INTRAMUSCULAR; INTRAVENOUS; SUBCUTANEOUS EVERY 10 MIN PRN
Status: DISCONTINUED | OUTPATIENT
Start: 2019-06-14 | End: 2019-06-14 | Stop reason: HOSPADM

## 2019-06-14 RX ORDER — FENTANYL CITRATE 50 UG/ML
INJECTION, SOLUTION INTRAMUSCULAR; INTRAVENOUS AS NEEDED
Status: DISCONTINUED | OUTPATIENT
Start: 2019-06-14 | End: 2019-06-14 | Stop reason: SURG

## 2019-06-14 RX ORDER — DIPHENHYDRAMINE HCL 50 MG/ML
12.5 VIAL (ML) INJECTION
Status: DISCONTINUED | OUTPATIENT
Start: 2019-06-14 | End: 2019-06-14 | Stop reason: HOSPADM

## 2019-06-14 RX ORDER — SCOPOLAMINE 1 MG/3D
1 PATCH, EXTENDED RELEASE TRANSDERMAL ONCE
Status: DISCONTINUED | OUTPATIENT
Start: 2019-06-14 | End: 2019-06-14 | Stop reason: HOSPADM

## 2019-06-14 RX ORDER — SCOPOLAMINE 1 MG/3D
PATCH, EXTENDED RELEASE TRANSDERMAL
Status: DISCONTINUED
Start: 2019-06-14 | End: 2019-06-14 | Stop reason: HOSPADM

## 2019-06-14 RX ORDER — DEXTROSE 40 %
15-30 GEL (GRAM) ORAL AS NEEDED
Status: DISCONTINUED | OUTPATIENT
Start: 2019-06-14 | End: 2019-06-14 | Stop reason: HOSPADM

## 2019-06-14 RX ORDER — IBUPROFEN 200 MG
16-32 TABLET ORAL AS NEEDED
Status: DISCONTINUED | OUTPATIENT
Start: 2019-06-14 | End: 2019-06-14 | Stop reason: HOSPADM

## 2019-06-14 RX ORDER — ACETAMINOPHEN 325 MG/1
650 TABLET ORAL EVERY 4 HOURS PRN
Status: DISCONTINUED | OUTPATIENT
Start: 2019-06-14 | End: 2019-06-14 | Stop reason: HOSPADM

## 2019-06-14 RX ORDER — MIDAZOLAM HYDROCHLORIDE 2 MG/2ML
INJECTION, SOLUTION INTRAMUSCULAR; INTRAVENOUS AS NEEDED
Status: DISCONTINUED | OUTPATIENT
Start: 2019-06-14 | End: 2019-06-14 | Stop reason: SURG

## 2019-06-14 RX ORDER — DEXTROSE 50 % IN WATER (D50W) INTRAVENOUS SYRINGE
25 AS NEEDED
Status: DISCONTINUED | OUTPATIENT
Start: 2019-06-14 | End: 2019-06-14 | Stop reason: HOSPADM

## 2019-06-14 RX ORDER — LIDOCAINE HYDROCHLORIDE 10 MG/ML
INJECTION, SOLUTION INFILTRATION; PERINEURAL AS NEEDED
Status: DISCONTINUED | OUTPATIENT
Start: 2019-06-14 | End: 2019-06-14 | Stop reason: HOSPADM

## 2019-06-14 RX ORDER — BACITRACIN 500 UNIT/G
OINTMENT (GRAM) TOPICAL AS NEEDED
Status: DISCONTINUED | OUTPATIENT
Start: 2019-06-14 | End: 2019-06-14 | Stop reason: HOSPADM

## 2019-06-14 RX ADMIN — KETOROLAC TROMETHAMINE 30 MG: 30 INJECTION, SOLUTION INTRAMUSCULAR at 13:09

## 2019-06-14 RX ADMIN — FENTANYL CITRATE 25 MCG: 50 INJECTION, SOLUTION INTRAMUSCULAR; INTRAVENOUS at 13:04

## 2019-06-14 RX ADMIN — FENTANYL CITRATE 25 MCG: 50 INJECTION, SOLUTION INTRAMUSCULAR; INTRAVENOUS at 12:47

## 2019-06-14 RX ADMIN — SCOPALAMINE 1 PATCH: 1 PATCH, EXTENDED RELEASE TRANSDERMAL at 10:59

## 2019-06-14 RX ADMIN — SODIUM CHLORIDE: 9 INJECTION, SOLUTION INTRAVENOUS at 11:00

## 2019-06-14 RX ADMIN — FENTANYL CITRATE 25 MCG: 50 INJECTION, SOLUTION INTRAMUSCULAR; INTRAVENOUS at 12:58

## 2019-06-14 RX ADMIN — MIDAZOLAM HYDROCHLORIDE 2 MG: 1 INJECTION, SOLUTION INTRAMUSCULAR; INTRAVENOUS at 12:43

## 2019-06-14 RX ADMIN — ONDANSETRON 4 MG: 2 INJECTION INTRAMUSCULAR; INTRAVENOUS at 12:54

## 2019-06-14 RX ADMIN — PROPOFOL 50 MG: 10 INJECTION, EMULSION INTRAVENOUS at 12:50

## 2019-06-14 RX ADMIN — PROPOFOL 50 MG: 10 INJECTION, EMULSION INTRAVENOUS at 12:57

## 2019-06-14 RX ADMIN — DEXAMETHASONE SODIUM PHOSPHATE 4 MG: 4 INJECTION, SOLUTION INTRAMUSCULAR; INTRAVENOUS at 12:54

## 2019-06-14 RX ADMIN — PROPOFOL 50 MCG/KG/MIN: 10 INJECTION, EMULSION INTRAVENOUS at 12:54

## 2019-06-14 ASSESSMENT — PAIN - FUNCTIONAL ASSESSMENT: PAIN_FUNCTIONAL_ASSESSMENT: NO/DENIES PAIN

## 2019-06-14 NOTE — DISCHARGE INSTRUCTIONS
"You had a biopsy of the distal anal canal under anesthesia today.    Any pain should be controlled using IBU or Tylenol.    You can use topical Lidocaine over the counter for surface pain - Recticare is a good brand.    There is a tiny open wound which may ooze a bit.  Place a gauze there if you need it.    Avoid constipation.    Please come see me in 4 months for another routine checkup    Please call me in one week for the biopsy results.  I highly doubt the \"nodule\" had anything to do with cancer.    Call my office with any questions.    [phone]  "

## 2019-06-14 NOTE — ANESTHESIA POSTPROCEDURE EVALUATION
Patient: Zuleika Barrera    Procedure Summary     Date:  06/14/19 Room / Location:  [facility] OR  / [facility] OR [facility]    Anesthesia Start:  1246 Anesthesia Stop:  1313    Procedure:  EUa, possible biopsy anal canal (N/A ) Diagnosis:       Anal cancer (CMS/HCC) (HCC)      (Anal cancer (CMS/HCC) (HCC) [C21.0])      ()      (** CARDIAC CLEARANCE DR. ROSS )    Surgeon:  Aamir Reeves MD Responsible Provider:  Katherine Doll MD    Anesthesia Type:  general ASA Status:  2          Anesthesia Type: general  PACU Vitals  6/14/2019 1310 - 6/14/2019 1401      6/14/2019 1316 6/14/2019 1320 6/14/2019 1330 6/14/2019 1340    BP: - (!)  97/59 102/62 102/63    Temp: 37.1 °C (98.7 °F) - - -    Pulse: - 71 68 72    Resp: - 16 (!)  11 14    SpO2: - 99 % 100 % 100 %              6/14/2019 1350             BP: 105/65       Temp: 36.8 °C (98.2 °F)       Pulse: 66       Resp: 13       SpO2: 100 %               Anesthesia Post Evaluation    Pain management: adequate  Patient location during evaluation: PACU  Patient participation: complete - patient participated  Level of consciousness: awake and alert  Cardiovascular status: acceptable  Airway Patency: adequate  Respiratory status: acceptable  Hydration status: acceptable  Anesthetic complications: no

## 2019-06-14 NOTE — ANESTHESIA PREPROCEDURE EVALUATION
Anesthesia ROS/MED HX    Anesthesia History    History of anesthetic complications  Pulmonary    asthma  Cardiovascular  Comments: Reported intact per pt, implants, short chin  Hematological    anemia  Musculoskeletal   Osteoarthritis   Arthritis  Endo/Other  Body Habitus: Normal  ROS/MED HX Comments:    GI/Hepatic/Renal: Anal cancer s/p chemotherapy and radiation   Hematological: Leukopenia      Past Surgical History:   Procedure Laterality Date   •  SECTION     • COLONOSCOPY     • COSMETIC SURGERY      abdominoplasty   • HIP ARTHROSCOPY Left    • HYSTERECTOMY      prolapse   • IMPLANT      1 upper left dental implant   • JOINT REPLACEMENT Left about     hip   • SHOULDER ARTHROSCOPY Right    • WISDOM TOOTH EXTRACTION         Physical Exam    Airway   Mallampati: II   TM distance: >3 FB   Neck ROM: full  Cardiovascular - normal   Rhythm: regular   Rate: normal  Pulmonary - normal   clear to auscultation  Other Findings   Reported intact per pt, implants, short chin        Anesthesia Plan    Plan: general    Technique: total IV anesthesia     Lines and Monitors: PIV     Airway: natural airway / supplemental oxygen   ASA 2  Blood Products:   Use of Blood Products Discussed: No   Anesthetic plan and risks discussed with: patient  Induction:    intravenous   Postop Plan:   Patient Disposition: phase II then home   Pain Management: IV analgesics

## 2019-06-17 LAB
CASE RPRT: NORMAL
CLINICAL INFO: NORMAL
PATH REPORT.FINAL DX SPEC: NORMAL
PATH REPORT.GROSS SPEC: NORMAL

## 2019-10-15 ENCOUNTER — OFFICE VISIT (OUTPATIENT)
Dept: SURGERY | Facility: CLINIC | Age: 64
End: 2019-10-15
Payer: COMMERCIAL

## 2019-10-15 VITALS
BODY MASS INDEX: 20.83 KG/M2 | HEIGHT: 65 IN | SYSTOLIC BLOOD PRESSURE: 100 MMHG | DIASTOLIC BLOOD PRESSURE: 60 MMHG | WEIGHT: 125 LBS

## 2019-10-15 DIAGNOSIS — C21.0 ANAL CANCER (CMS/HCC): Primary | ICD-10-CM

## 2019-10-15 PROCEDURE — 99213 OFFICE O/P EST LOW 20 MIN: CPT | Performed by: SURGERY

## 2019-10-15 RX ORDER — AZELASTINE 1 MG/ML
SPRAY, METERED NASAL
COMMUNITY

## 2019-10-15 RX ORDER — ESTRADIOL 0.1 MG/G
CREAM VAGINAL
COMMUNITY
Start: 2019-03-29

## 2019-10-15 RX ORDER — IBUPROFEN 200 MG
TABLET ORAL DAILY PRN
COMMUNITY

## 2019-10-15 NOTE — LETTER
October 15, 2019     Cabrera Alonso DO  [address]  [facility] [address]  [facility] [address]    Patient: Zuleika Barrera  YOB: 1955  Date of Visit: 10/15/2019      Dear Dr. Alonso:    Thank you for referring Zuleika Barrera to me for evaluation. Below are my notes for this consultation.    If you have questions, please do not hesitate to call me. I look forward to following your patient along with you.         Sincerely,        Aamir Reeves MD        CC: No Recipients  Aamir Reeves MD  10/15/2019  3:32 PM  Sign at close encounter      Chief Complaint:   Chief Complaint   Patient presents with   • Follow-up   .    HPI     Patient is a 64 y.o. female who presents with the following:  Pt feels abd bloating and has discharge and some rectal bleeding -     Had anal CA - found in  and final OR bx  - nothing left in  -     This is our 4 mo checkup -     Not bleed every day - can be on paper or in bowl - also mucousy - pt has chronic abd distention - no pain with BM -     Mass used to b posteriior  - 30% lumen -     Medical History:   Past Medical History:   Diagnosis Date   • Anal cancer (CMS/HCC)     radiation and chemotx -last 2019; having GI bloating issues, difficult to determine if cause is dietary or from tx   • Anemia    • Arthritis    • Asthma     exercise induced   • PONV (postoperative nausea and vomiting)        Surgical History:   Past Surgical History:   Procedure Laterality Date   •  SECTION     • COLONOSCOPY     • COSMETIC SURGERY      abdominoplasty   • HIP ARTHROSCOPY Left    • HYSTERECTOMY      prolapse   • IMPLANT      1 upper left dental implant   • JOINT REPLACEMENT Left about     hip   • SHOULDER ARTHROSCOPY Right    • WISDOM TOOTH EXTRACTION         Social History:   Social History     Socioeconomic History   • Marital status: Single     Spouse name: Not on file   • Number of children: Not on file   • Years of education: Not on  file   • Highest education level: Not on file   Occupational History   • Not on file   Social Needs   • Financial resource strain: Not on file   • Food insecurity:     Worry: Not on file     Inability: Not on file   • Transportation needs:     Medical: Not on file     Non-medical: Not on file   Tobacco Use   • Smoking status: Never Smoker   • Smokeless tobacco: Never Used   Substance and Sexual Activity   • Alcohol use: Yes     Alcohol/week: 5.0 standard drinks     Types: 5 Glasses of wine per week   • Drug use: No   • Sexual activity: Defer   Lifestyle   • Physical activity:     Days per week: Not on file     Minutes per session: Not on file   • Stress: Not on file   Relationships   • Social connections:     Talks on phone: Not on file     Gets together: Not on file     Attends Yazidism service: Not on file     Active member of club or organization: Not on file     Attends meetings of clubs or organizations: Not on file     Relationship status: Not on file   • Intimate partner violence:     Fear of current or ex partner: Not on file     Emotionally abused: Not on file     Physically abused: Not on file     Forced sexual activity: Not on file   Other Topics Concern   • Not on file   Social History Narrative    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 No pertinent family history.    Allergies:   Hydromorphone    Current Medications:      Current Outpatient Medications:   •  albuterol HFA (PROAIR HFA) 90 mcg/actuation inhaler, as needed. , Disp: , Rfl:   •  azelastine (ASTELIN) 137 mcg (0.1 %) nasal spray, azelastine 137 mcg (0.1 %) nasal spray aerosol  USE 2 SPRAYS IN EACH NOSTRIL 2 TIMES DAILY AS NEEDED, Disp: , Rfl:   •  estradiol (ESTRACE) 0.01 % (0.1 mg/gram) vaginal cream, APPLY 0.5 G BY VAGINAL ROUTE 2 TIMES PER WEEK., Disp: , Rfl:   •  estradiol (ESTRACE) 0.5 mg tablet, Take 0.5 mg by mouth daily., Disp: , Rfl:   •  FLUoxetine (PROzac) 40 mg  capsule, Take 40 mg by mouth every morning.  , Disp: , Rfl:   •  ibuprofen (ADVIL) 200 mg tablet, Take by mouth once daily as needed., Disp: , Rfl:   •  LORazepam (ATIVAN) 0.5 mg tablet, Take 0.5 mg by mouth as needed.  , Disp: , Rfl:   •  tretinoin (RETIN-A) 0.025 % cream, APPLY TO CLEAN DRY FACE NIGHTLY, Disp: , Rfl:   •  zolpidem (AMBIEN) 10 mg tablet, Take 10 mg by mouth as needed.  , Disp: , Rfl:   •  triamcinolone (NASACORT) 55 mcg nasal inhaler, Administer 2 sprays into each nostril as needed.  , Disp: , Rfl:     Review of Systems  All 14 systems reviewed and the findings are not pertinent to the current problem.    Objective     Vital Signs  Vitals:    10/15/19 1030   BP: 100/60     Body mass index is 20.8 kg/m².      Physicial Exam  General Appearance:  Well developed.  Well nourished.  In no acute distress. Patient was not obese.  Head:  Posture of the head was normal.  Neck:  External features of the neck was normal.  Trachea:  Showed no abnormalities.  Trachea midline.  Extraocular Movements:  Normal.  Pupils:  Reactive to light.   Not deformed.  Oral Cavity:  Mixed dentition was not observed.  Tongue was midline.  Peripheral edema:  Not present.  Genitalia:  Normal.    Anorectal Examination:    No pilonidal sinus was observed.   No pilonidal cyst was observed.   Perianal skin was not erythematous.  No perianal wart was observed.  No anal fissure was observed.   Anus did not have a fistula.   Anal sphincter tone was normal.   No hemorrhoids were seen.   No external anal skin tags were observed.   Anus had no mass.  Rectum:  No rectal abscess was observed.   Rectal prolapse was not observed.   No rectal fistula was observed.   Rectal exam was not tender.   No rectal fluctuance was observed.  Rectal exam showed no mass.   Normal devon-anal skin with no lesions or dermatitis    Smooth posterior anal scar - no mass    Other Exam:  Musculoskeletal System:  No gross defecits or defects of the upper or lower  extremities.  No cyanosis of the fingers.  Lumbar / Lumbosacral Spine:  Lumbar/lumbosacral spine exhibited no abnormalities.  Bilateral thighs:   Posterior aspect was not swollen.   Posterior aspect showed no induration.   Posterior aspect was not erythematous.   Posterior aspect was not warm.   No mass on the posterior aspect.  Functional Exam:   General/bilateral:  Mobility was not limited.  Neurological:   No confusion was observed.   No disorientation was observed.  Speech:  Normal.  Motor:  No tremor was seen.  Balance:  Normal.  Gait And Stance:  Normal.  Psychiatric:  Mood:  Euthymic.  Affect:  Normal.  Skin:  No clubbing of the fingernails.  Normal upper and lower extremity skin exam.      Problem List Items Addressed This Visit     Anal cancer (CMS/HCC) - Primary     She is one year out from discovery of a posterior anal mass that was a squamous cell CA.  She has responded nicely to radiation and chemotherapy.  On exam today the posterior anus is smooth and has no mass effect.  I will see her in four months.                     Aamir Reeves MD 10/15/2019 3:32 PM

## 2019-10-15 NOTE — PROGRESS NOTES
Chief Complaint:   Chief Complaint   Patient presents with   • Follow-up   .    HPI     Patient is a 64 y.o. female who presents with the following:  Pt feels abd bloating and has discharge and some rectal bleeding -     Had anal CA - found in  and final OR bx  - nothing left in  -     This is our 4 mo checkup -     Not bleed every day - can be on paper or in bowl - also mucousy - pt has chronic abd distention - no pain with BM -     Mass used to b posteriior  - 30% lumen -     Medical History:   Past Medical History:   Diagnosis Date   • Anal cancer (CMS/HCC)     radiation and chemotx -last 2019; having GI bloating issues, difficult to determine if cause is dietary or from tx   • Anemia    • Arthritis    • Asthma     exercise induced   • PONV (postoperative nausea and vomiting)        Surgical History:   Past Surgical History:   Procedure Laterality Date   •  SECTION     • COLONOSCOPY     • COSMETIC SURGERY      abdominoplasty   • HIP ARTHROSCOPY Left    • HYSTERECTOMY      prolapse   • IMPLANT      1 upper left dental implant   • JOINT REPLACEMENT Left about     hip   • SHOULDER ARTHROSCOPY Right    • WISDOM TOOTH EXTRACTION         Social History:   Social History     Socioeconomic History   • Marital status: Single     Spouse name: Not on file   • Number of children: Not on file   • Years of education: Not on file   • Highest education level: Not on file   Occupational History   • Not on file   Social Needs   • Financial resource strain: Not on file   • Food insecurity:     Worry: Not on file     Inability: Not on file   • Transportation needs:     Medical: Not on file     Non-medical: Not on file   Tobacco Use   • Smoking status: Never Smoker   • Smokeless tobacco: Never Used   Substance and Sexual Activity   • Alcohol use: Yes     Alcohol/week: 5.0 standard drinks     Types: 5 Glasses of wine per week   • Drug use: No   • Sexual activity: Defer   Lifestyle   •  Physical activity:     Days per week: Not on file     Minutes per session: Not on file   • Stress: Not on file   Relationships   • Social connections:     Talks on phone: Not on file     Gets together: Not on file     Attends Roman [employer] service: Not on file     Active member of club or organization: Not on file     Attends meetings of clubs or organizations: Not on file     Relationship status: Not on file   • Intimate partner violence:     Fear of current or ex partner: Not on file     Emotionally abused: Not on file     Physically abused: Not on file     Forced sexual activity: Not on file   Other Topics Concern   • Not on file   Social History Narrative    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 No pertinent family history.    Allergies:   Hydromorphone    Current Medications:      Current Outpatient Medications:   •  albuterol HFA (PROAIR HFA) 90 mcg/actuation inhaler, as needed. , Disp: , Rfl:   •  azelastine (ASTELIN) 137 mcg (0.1 %) nasal spray, azelastine 137 mcg (0.1 %) nasal spray aerosol  USE 2 SPRAYS IN EACH NOSTRIL 2 TIMES DAILY AS NEEDED, Disp: , Rfl:   •  estradiol (ESTRACE) 0.01 % (0.1 mg/gram) vaginal cream, APPLY 0.5 G BY VAGINAL ROUTE 2 TIMES PER WEEK., Disp: , Rfl:   •  estradiol (ESTRACE) 0.5 mg tablet, Take 0.5 mg by mouth daily., Disp: , Rfl:   •  FLUoxetine (PROzac) 40 mg capsule, Take 40 mg by mouth every morning.  , Disp: , Rfl:   •  ibuprofen (ADVIL) 200 mg tablet, Take by mouth once daily as needed., Disp: , Rfl:   •  LORazepam (ATIVAN) 0.5 mg tablet, Take 0.5 mg by mouth as needed.  , Disp: , Rfl:   •  tretinoin (RETIN-A) 0.025 % cream, APPLY TO CLEAN DRY FACE NIGHTLY, Disp: , Rfl:   •  zolpidem (AMBIEN) 10 mg tablet, Take 10 mg by mouth as needed.  , Disp: , Rfl:   •  triamcinolone (NASACORT) 55 mcg nasal inhaler, Administer 2 sprays into each nostril as needed.  , Disp: , Rfl:     Review of Systems  All 14 systems  reviewed and the findings are not pertinent to the current problem.    Objective     Vital Signs  Vitals:    10/15/19 1030   BP: 100/60     Body mass index is 20.8 kg/m².      Physicial Exam  General Appearance:  Well developed.  Well nourished.  In no acute distress. Patient was not obese.  Head:  Posture of the head was normal.  Neck:  External features of the neck was normal.  Trachea:  Showed no abnormalities.  Trachea midline.  Extraocular Movements:  Normal.  Pupils:  Reactive to light.   Not deformed.  Oral Cavity:  Mixed dentition was not observed.  Tongue was midline.  Peripheral edema:  Not present.  Genitalia:  Normal.    Anorectal Examination:    No pilonidal sinus was observed.   No pilonidal cyst was observed.   Perianal skin was not erythematous.  No perianal wart was observed.  No anal fissure was observed.   Anus did not have a fistula.   Anal sphincter tone was normal.   No hemorrhoids were seen.   No external anal skin tags were observed.   Anus had no mass.  Rectum:  No rectal abscess was observed.   Rectal prolapse was not observed.   No rectal fistula was observed.   Rectal exam was not tender.   No rectal fluctuance was observed.  Rectal exam showed no mass.   Normal devon-anal skin with no lesions or dermatitis    Smooth posterior anal scar - no mass    Other Exam:  Musculoskeletal System:  No gross defecits or defects of the upper or lower extremities.  No cyanosis of the fingers.  Lumbar / Lumbosacral Spine:  Lumbar/lumbosacral spine exhibited no abnormalities.  Bilateral thighs:   Posterior aspect was not swollen.   Posterior aspect showed no induration.   Posterior aspect was not erythematous.   Posterior aspect was not warm.   No mass on the posterior aspect.  Functional Exam:   General/bilateral:  Mobility was not limited.  Neurological:   No confusion was observed.   No disorientation was observed.  Speech:  Normal.  Motor:  No tremor was seen.  Balance:  Normal.  Gait And Stance:   Normal.  Psychiatric:  Mood:  Euthymic.  Affect:  Normal.  Skin:  No clubbing of the fingernails.  Normal upper and lower extremity skin exam.      Problem List Items Addressed This Visit     Anal cancer (CMS/HCC) - Primary     She is one year out from discovery of a posterior anal mass that was a squamous cell CA.  She has responded nicely to radiation and chemotherapy.  On exam today the posterior anus is smooth and has no mass effect.  I will see her in four months.                     Aamir Reeves MD 10/15/2019 3:32 PM

## 2019-10-15 NOTE — ASSESSMENT & PLAN NOTE
She is one year out from discovery of a posterior anal mass that was a squamous cell CA.  She has responded nicely to radiation and chemotherapy.  On exam today the posterior anus is smooth and has no mass effect.  I will see her in four months.

## 2020-02-16 NOTE — PROGRESS NOTES
Chief Complaint:   Chief Complaint   Patient presents with   • Follow-up   .    HPI     Patient is a 64 y.o. female who presents with the following:  This lady has a hx of anal CA as below:     - scope - poorly diff anal CA - posterior - 4cm long to distal anal verge  19 - OR for EUA, no mass left - bx tiny distal anal nodule - not going to be CA  10/15/19 - office - smooth posterior anus    No pain - small amt bleeding - fecal control not great - taking immodium plus gas - helps a lot - BM once per day - no big accidents - urgency can be bad - has some mucous -      Last colonoscopy - ???    Medical History:   Past Medical History:   Diagnosis Date   • Anal cancer (CMS/HCC)     radiation and chemotx -last 2019; having GI bloating issues, difficult to determine if cause is dietary or from tx   • Anemia    • Arthritis    • Asthma     exercise induced   • PONV (postoperative nausea and vomiting)        Surgical History:   Past Surgical History:   Procedure Laterality Date   •  SECTION     • COLONOSCOPY     • COSMETIC SURGERY      abdominoplasty   • HIP ARTHROSCOPY Left    • HYSTERECTOMY      prolapse   • IMPLANT      1 upper left dental implant   • JOINT REPLACEMENT Left about     hip   • SHOULDER ARTHROSCOPY Right    • WISDOM TOOTH EXTRACTION         Social History:   Social History     Socioeconomic History   • Marital status: Single     Spouse name: Not on file   • Number of children: Not on file   • Years of education: Not on file   • Highest education level: Not on file   Occupational History   • Not on file   Social Needs   • Financial resource strain: Not on file   • Food insecurity:     Worry: Not on file     Inability: Not on file   • Transportation needs:     Medical: Not on file     Non-medical: Not on file   Tobacco Use   • Smoking status: Never Smoker   • Smokeless tobacco: Never Used   Substance and Sexual Activity   • Alcohol use: Yes     Alcohol/week: 5.0  standard drinks     Types: 5 Glasses of wine per week   • Drug use: No   • Sexual activity: Defer   Lifestyle   • Physical activity:     Days per week: Not on file     Minutes per session: Not on file   • Stress: Not on file   Relationships   • Social connections:     Talks on phone: Not on file     Gets together: Not on file     Attends Alevism service: Not on file     Active member of club or organization: Not on file     Attends meetings of clubs or organizations: Not on file     Relationship status: Not on file   • Intimate partner violence:     Fear of current or ex partner: Not on file     Emotionally abused: Not on file     Physically abused: Not on file     Forced sexual activity: Not on file   Other Topics Concern   • Not on file   Social History Narrative    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 No pertinent family history.    Allergies:   Hydromorphone    Current Medications:      Current Outpatient Medications:   •  albuterol HFA (PROAIR HFA) 90 mcg/actuation inhaler, as needed. , Disp: , Rfl:   •  azelastine (ASTELIN) 137 mcg (0.1 %) nasal spray, azelastine 137 mcg (0.1 %) nasal spray aerosol  USE 2 SPRAYS IN EACH NOSTRIL 2 TIMES DAILY AS NEEDED, Disp: , Rfl:   •  estradiol (ESTRACE) 0.01 % (0.1 mg/gram) vaginal cream, APPLY 0.5 G BY VAGINAL ROUTE 2 TIMES PER WEEK., Disp: , Rfl:   •  estradiol (ESTRACE) 0.5 mg tablet, Take 0.5 mg by mouth daily., Disp: , Rfl:   •  FLUoxetine (PROzac) 40 mg capsule, Take 40 mg by mouth every morning.  , Disp: , Rfl:   •  ibuprofen (ADVIL) 200 mg tablet, Take by mouth once daily as needed., Disp: , Rfl:   •  LORazepam (ATIVAN) 0.5 mg tablet, Take 0.5 mg by mouth as needed.  , Disp: , Rfl:   •  tretinoin (RETIN-A) 0.025 % cream, APPLY TO CLEAN DRY FACE NIGHTLY, Disp: , Rfl:   •  zolpidem (AMBIEN) 10 mg tablet, Take 10 mg by mouth as needed.  , Disp: , Rfl:   •  triamcinolone (NASACORT) 55 mcg nasal  inhaler, Administer 2 sprays into each nostril as needed.  , Disp: , Rfl:     Review of Systems  All 14 systems reviewed and the findings are not pertinent to the current problem.    Objective     Vital Signs  Vitals:    02/18/20 1032   BP: 120/79     Body mass index is 20.8 kg/m².      Physicial Exam  General Appearance:  Well developed.  Well nourished.  In no acute distress. Patient was not obese.  Head:  Posture of the head was normal.  Neck:  External features of the neck was normal.  Trachea:  Showed no abnormalities.  Trachea midline.  Extraocular Movements:  Normal.  Pupils:  Reactive to light.   Not deformed.  Oral Cavity:  Mixed dentition was not observed.  Tongue was midline.  Peripheral edema:  Not present.  Genitalia:  Normal.    Anorectal Examination:    No pilonidal sinus was observed.   No pilonidal cyst was observed.   Perianal skin was not erythematous.  No perianal wart was observed.  No anal fissure was observed.   Anus did not have a fistula.   Anal sphincter tone was normal.   No hemorrhoids were seen.   No external anal skin tags were observed.   Anus had no mass.  Rectum:  No rectal abscess was observed.   Rectal prolapse was not observed.   No rectal fistula was observed.   Rectal exam was not tender.   No rectal fluctuance was observed.  Rectal exam showed no mass.   Normal devon-anal skin with no lesions or dermatitis    Very smooth posterior anus  - no mass - min skin damage    Other Exam:  Musculoskeletal System:  No gross defecits or defects of the upper or lower extremities.  No cyanosis of the fingers.  Lumbar / Lumbosacral Spine:  Lumbar/lumbosacral spine exhibited no abnormalities.  Bilateral thighs:   Posterior aspect was not swollen.   Posterior aspect showed no induration.   Posterior aspect was not erythematous.   Posterior aspect was not warm.   No mass on the posterior aspect.  Functional Exam:   General/bilateral:  Mobility was not limited.  Neurological:   No confusion was  observed.   No disorientation was observed.  Speech:  Normal.  Motor:  No tremor was seen.  Balance:  Normal.  Gait And Stance:  Normal.  Psychiatric:  Mood:  Euthymic.  Affect:  Normal.  Skin:  No clubbing of the fingernails.  Normal upper and lower extremity skin exam.      Problem List Items Addressed This Visit     Anal cancer (CMS/HCC) - Primary     She has minimal symptoms and has a normal exam today 15 months after discovery of a posterior anal cancer.  She has acceptable bowel function and no sign of recurrent disease.  My plan is a repeat exam in 6 months and a colonoscopy whenever her oncologist says she needs one.                     Aamir Reeves MD 2/18/2020 10:58 AM

## 2020-02-18 ENCOUNTER — OFFICE VISIT (OUTPATIENT)
Dept: SURGERY | Facility: CLINIC | Age: 65
End: 2020-02-18
Payer: COMMERCIAL

## 2020-02-18 VITALS
SYSTOLIC BLOOD PRESSURE: 120 MMHG | HEIGHT: 65 IN | WEIGHT: 125 LBS | BODY MASS INDEX: 20.83 KG/M2 | DIASTOLIC BLOOD PRESSURE: 79 MMHG

## 2020-02-18 DIAGNOSIS — C21.0 ANAL CANCER (CMS/HCC): Primary | ICD-10-CM

## 2020-02-18 PROCEDURE — 99213 OFFICE O/P EST LOW 20 MIN: CPT | Performed by: SURGERY

## 2020-02-18 NOTE — LETTER
2020     Cabrera Alonso DO  [address]  [facility] [address]  [facility] [address]    Patient: Zuleika Barrera  YOB: 1955  Date of Visit: 2020      Dear Dr. Alonso:    Thank you for referring Zuleika Barrera to me for evaluation. Below are my notes for this consultation.    If you have questions, please do not hesitate to call me. I look forward to following your patient along with you.         Sincerely,        Aamir Reeves MD        CC: MD Lala Merino Philip Y, MD  2020 10:59 AM  Sign at close encounter      Chief Complaint:   Chief Complaint   Patient presents with   • Follow-up   .    HPI     Patient is a 64 y.o. female who presents with the following:  This lady has a hx of anal CA as below:     - scope - poorly diff anal CA - posterior - 4cm long to distal anal verge  19 - OR for EUA, no mass left - bx tiny distal anal nodule - not going to be CA  10/15/19 - office - smooth posterior anus    No pain - small amt bleeding - fecal control not great - taking immodium plus gas - helps a lot - BM once per day - no big accidents - urgency can be bad - has some mucous -      Last colonoscopy - ???    Medical History:   Past Medical History:   Diagnosis Date   • Anal cancer (CMS/HCC)     radiation and chemotx -last 2019; having GI bloating issues, difficult to determine if cause is dietary or from tx   • Anemia    • Arthritis    • Asthma     exercise induced   • PONV (postoperative nausea and vomiting)        Surgical History:   Past Surgical History:   Procedure Laterality Date   •  SECTION     • COLONOSCOPY     • COSMETIC SURGERY      abdominoplasty   • HIP ARTHROSCOPY Left    • HYSTERECTOMY      prolapse   • IMPLANT      1 upper left dental implant   • JOINT REPLACEMENT Left about     hip   • SHOULDER ARTHROSCOPY Right    • WISDOM TOOTH EXTRACTION         Social History:   Social History     Socioeconomic History   •  Marital status: Single     Spouse name: Not on file   • Number of children: Not on file   • Years of education: Not on file   • Highest education level: Not on file   Occupational History   • Not on file   Social Needs   • Financial resource strain: Not on file   • Food insecurity:     Worry: Not on file     Inability: Not on file   • Transportation needs:     Medical: Not on file     Non-medical: Not on file   Tobacco Use   • Smoking status: Never Smoker   • Smokeless tobacco: Never Used   Substance and Sexual Activity   • Alcohol use: Yes     Alcohol/week: 5.0 standard drinks     Types: 5 Glasses of wine per week   • Drug use: No   • Sexual activity: Defer   Lifestyle   • Physical activity:     Days per week: Not on file     Minutes per session: Not on file   • Stress: Not on file   Relationships   • Social connections:     Talks on phone: Not on file     Gets together: Not on file     Attends Confucianist service: Not on file     Active member of club or organization: Not on file     Attends meetings of clubs or organizations: Not on file     Relationship status: Not on file   • Intimate partner violence:     Fear of current or ex partner: Not on file     Emotionally abused: Not on file     Physically abused: Not on file     Forced sexual activity: Not on file   Other Topics Concern   • Not on file   Social History Narrative    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 No pertinent family history.    Allergies:   Hydromorphone    Current Medications:      Current Outpatient Medications:   •  albuterol HFA (PROAIR HFA) 90 mcg/actuation inhaler, as needed. , Disp: , Rfl:   •  azelastine (ASTELIN) 137 mcg (0.1 %) nasal spray, azelastine 137 mcg (0.1 %) nasal spray aerosol  USE 2 SPRAYS IN EACH NOSTRIL 2 TIMES DAILY AS NEEDED, Disp: , Rfl:   •  estradiol (ESTRACE) 0.01 % (0.1 mg/gram) vaginal cream, APPLY 0.5 G BY VAGINAL ROUTE 2 TIMES PER WEEK., Disp: ,  Rfl:   •  estradiol (ESTRACE) 0.5 mg tablet, Take 0.5 mg by mouth daily., Disp: , Rfl:   •  FLUoxetine (PROzac) 40 mg capsule, Take 40 mg by mouth every morning.  , Disp: , Rfl:   •  ibuprofen (ADVIL) 200 mg tablet, Take by mouth once daily as needed., Disp: , Rfl:   •  LORazepam (ATIVAN) 0.5 mg tablet, Take 0.5 mg by mouth as needed.  , Disp: , Rfl:   •  tretinoin (RETIN-A) 0.025 % cream, APPLY TO CLEAN DRY FACE NIGHTLY, Disp: , Rfl:   •  zolpidem (AMBIEN) 10 mg tablet, Take 10 mg by mouth as needed.  , Disp: , Rfl:   •  triamcinolone (NASACORT) 55 mcg nasal inhaler, Administer 2 sprays into each nostril as needed.  , Disp: , Rfl:     Review of Systems  All 14 systems reviewed and the findings are not pertinent to the current problem.    Objective     Vital Signs  Vitals:    02/18/20 1032   BP: 120/79     Body mass index is 20.8 kg/m².      Physicial Exam  General Appearance:  Well developed.  Well nourished.  In no acute distress. Patient was not obese.  Head:  Posture of the head was normal.  Neck:  External features of the neck was normal.  Trachea:  Showed no abnormalities.  Trachea midline.  Extraocular Movements:  Normal.  Pupils:  Reactive to light.   Not deformed.  Oral Cavity:  Mixed dentition was not observed.  Tongue was midline.  Peripheral edema:  Not present.  Genitalia:  Normal.    Anorectal Examination:    No pilonidal sinus was observed.   No pilonidal cyst was observed.   Perianal skin was not erythematous.  No perianal wart was observed.  No anal fissure was observed.   Anus did not have a fistula.   Anal sphincter tone was normal.   No hemorrhoids were seen.   No external anal skin tags were observed.   Anus had no mass.  Rectum:  No rectal abscess was observed.   Rectal prolapse was not observed.   No rectal fistula was observed.   Rectal exam was not tender.   No rectal fluctuance was observed.  Rectal exam showed no mass.   Normal devon-anal skin with no lesions or dermatitis    Very smooth  posterior anus  - no mass - min skin damage    Other Exam:  Musculoskeletal System:  No gross defecits or defects of the upper or lower extremities.  No cyanosis of the fingers.  Lumbar / Lumbosacral Spine:  Lumbar/lumbosacral spine exhibited no abnormalities.  Bilateral thighs:   Posterior aspect was not swollen.   Posterior aspect showed no induration.   Posterior aspect was not erythematous.   Posterior aspect was not warm.   No mass on the posterior aspect.  Functional Exam:   General/bilateral:  Mobility was not limited.  Neurological:   No confusion was observed.   No disorientation was observed.  Speech:  Normal.  Motor:  No tremor was seen.  Balance:  Normal.  Gait And Stance:  Normal.  Psychiatric:  Mood:  Euthymic.  Affect:  Normal.  Skin:  No clubbing of the fingernails.  Normal upper and lower extremity skin exam.      Problem List Items Addressed This Visit     Anal cancer (CMS/HCC) - Primary     She has minimal symptoms and has a normal exam today 15 months after discovery of a posterior anal cancer.  She has acceptable bowel function and no sign of recurrent disease.  My plan is a repeat exam in 6 months and a colonoscopy whenever her oncologist says she needs one.                     Aamir Reeves MD 2/18/2020 10:58 AM

## 2020-02-18 NOTE — ASSESSMENT & PLAN NOTE
She has minimal symptoms and has a normal exam today 15 months after discovery of a posterior anal cancer.  She has acceptable bowel function and no sign of recurrent disease.  My plan is a repeat exam in 6 months and a colonoscopy whenever her oncologist says she needs one.

## 2020-07-22 NOTE — OP NOTE
CC:   Chief Complaint   Patient presents with   • Gyn Exam        HPI: Cara Alfonso is a 35 year old female. Pt is using Lutera for contraception. Menses every 28 days lasting 3-5 days, moderate flow. Denies BTB. Requests Lutera or Lessina only.   Will occ get a \"drip\" of urine-leakage. Typically when it occurs it will be after she urinates.     Decided against genetic testing (strong pat family hx)     OB/GYN HISTORY  OB History    Para Term  AB Living   0 0 0 0 0 0   SAB TAB Ectopic Molar Multiple Live Births   0 0 0 0 0 0        Menstrual History  Patient's last menstrual period was 2020 (exact date).  Menstrual Tracking  Period Cycle (Days): 28  Period Duration (Days): 5  Period Pattern: Regular  Menstrual Flow: Moderate  Menstrual Control: Thin pad  Dysmenorrhea: (!) Mild  Dysmenorrhea Symptoms: Cramping  Menarche at age 14. She has no history of STDs.  Has history of abnormal Pap smears - Hx of LEEP in  & Cryo In .      Last Pap: 2018 WNL Cotesting Pap   Gardasil: Never    Past Medical History:   Diagnosis Date   • No known problems       Past Surgical History:   Procedure Laterality Date   • Leep     • [place] tooth extraction       Current Outpatient Medications   Medication Sig Dispense Refill   • LUTERA 0.1-20 MG-MCG per tablet Take 1 tablet by mouth daily.     • levonorgestrel-ethinyl estradiol (LESSINA-28) 0.1-20 MG-MCG per tablet Take 1 tablet by mouth daily. 84 tablet 4     No current facility-administered medications for this visit.       ALLERGIES:  No Known Allergies  Family History   Problem Relation Age of Onset   • Diabetes Father    • Heart disease Father    • COPD Father    • Stroke Father    • Dementia/Alzheimers Maternal Grandmother    • Cancer, Colon Maternal Grandfather    • Cancer Paternal Grandmother         Ovarian    • Heart disease Paternal Grandfather    • Cancer Paternal Grandfather         Lung   • Cancer, Breast Paternal Cousin 42     Social  Date of procedure - 06/14/19     Preoperative diagnosis - anal cancer s/p rads / chemo    Postop diagnosis - same - complete response    Procedure - EUA, biopsy anal canal nodule    Surgeon -  Lala Diop.- PGY 4    Anesthesia - local / mac    Specimen - anal nodule    Drains - none    EBL - minimal    Indications for procedure -  Pt has completed rads / chemo for large posterior anal cancer - needs followup in OR to bx any residual disease    Description of procedure -the patient was correctly identified and taken to the operating suite where she was rolled into the prone position on a large hip roll and after the adequate administration of propofol sedation the buttocks were taped apart and the perianal tissues were prepped and draped in the usual sterile fashion.  Used a total of 30 cc of lidocaine and Marcaine mixed together as a local block and the operation commenced.    Gentle digital rectal examination was done and the previously noted large ulcerating posterior mass that occupied 30% of the circumference of the anus was completely gone.  In its place with was smooth mucosa in the posterior anus.  Again no masses existed.  There was a tiny 3 mm nodule at the very distal anal verge and this was excised using the Bovie and passed off the field and labeled.  I doubt this is anything neoplastic.    A Hopson bivalve was placed in the anal canal and a complete exam of the entire anal canal was done with a high-powered headlight.  The posterior anal canal did not show any mucosal lesions at all.  There was nothing to biopsy.  Again there were no protruding masses.  The mucosal surface was smooth.  You could see scar tissue where the mass had previously been.    After complete exam and a tiny biopsy of the distal anal nodule bacitracin and a 4 x 4 were placed in the anal opening the patient was flipped back to supine and taken to the recovery room in stable condition.       History     Tobacco Use   • Smoking status: Never Smoker   • Smokeless tobacco: Never Used   Substance Use Topics   • Alcohol use: Never     Frequency: Never   • Drug use: Never      Histories were reviewed and updated during today's visit.    ROS  General/Constitutional Denies.    Urology SEE HPI   Integumentary Denies.    Women Only Denies.    Genitourinary Denies.    Skin Denies.    Gastrointestinal Denies.    Endocrine Denies.    Psychiatric Denies.    Health Education Admits.     Please review HPI for any of my pertinent findings.     PHYSICAL EXAM  Visit Vitals  /64 (BP Location: LUE - Left upper extremity, Patient Position: Sitting, Cuff Size: Regular)   Pulse 92   Temp 98.3 °F (36.8 °C) (Other (comment))   Resp 16   Ht 5' 8\" (1.727 m)   Wt 86.1 kg (189 lb 13.1 oz)   LMP 06/25/2020 (Exact Date)   BMI 28.86 kg/m²     Vital signs were reviewed during today's visit.   GENERAL APEARANCE:  The patient is a pleasant, normal appearing female with normal affect and in no distress.  BREASTS: Symmetrical, no masses, tenderness or skin changes, no axillary adenopathy.  VULVA: Inspection of her external genitalia reveals normal mons pubis, labia minora and labia majora.  Normal appearing clitoris.  VAGINA:  Speculum exam reveals pink and moist vaginal mucosa.    CERVIX: Normal, no CMT, friable w/ pap.  UTERUS: Normal size, shape and consistency.   ADNEXA: No masses or tenderness bilaterally.   PERINEUM:  Perineum appears normal. No lesions or masses.  ABDOMEN: Soft, non-tender, non-distended.  SKIN:  Warm and dry to touch.  No lesions or rashes noted.    PSYCHIATRIC/NEUROLOGIC:  Appropriate mood and affect, alert and oriented x 3.    ASSESSMENT  Diagnoses and all orders for this visit:  Women's annual routine gynecological examination  Uses oral contraception  Comments:  1 year refill of Lutera sent. Risks of OCPs discussed. Pt denies ACHES  Orders:  -     levonorgestrel-ethinyl estradiol (LESSINA-28) 0.1-20 MG-MCG  per tablet; Take 1 tablet by mouth daily.  HPV vaccine counseling  Screening for malignant neoplasm of cervix  -     PAPHPV WITH GENOTYPE PATIENTS OVER 30 YEARS OLD  Screening for HPV (human papillomavirus)  -     PAPHPV WITH GENOTYPE PATIENTS OVER 30 YEARS OLD      PLAN  Cotesting pap today, if WNL repeat pap in 2-3 years.   Declines STI Testing   Reviewed Gardasil info   Discussed her urinary complaint: encouraged pt to try bladder training & Kegels, limit caffeine.   All pt questions were answered & pt agrees with plan     Anan Bustos CNP  7/22/2020

## 2020-08-17 NOTE — PROGRESS NOTES
Chief Complaint:   Chief Complaint   Patient presents with   • Follow-up   .    HPI     Patient is a 64 y.o. female who presents with the following:  She is one year and nine months out from discovery of a large posterior anal cancer that was then treated with radiation and chemo therapy.   She definitely needs a colonoscopy in the very near future. She returns for a regular check up.     Pt taking low fodmap diet and this is helpoing a lot with distention and bloating -     Having OK function in her bottom    Medical History:   Past Medical History:   Diagnosis Date   • Anal cancer (CMS/HCC)     radiation and chemotx -last 2019; having GI bloating issues, difficult to determine if cause is dietary or from tx   • Anemia    • Arthritis    • Asthma     exercise induced   • PONV (postoperative nausea and vomiting)        Surgical History:   Past Surgical History:   Procedure Laterality Date   •  SECTION     • COLONOSCOPY     • COSMETIC SURGERY      abdominoplasty   • HIP ARTHROSCOPY Left    • HYSTERECTOMY      prolapse   • IMPLANT      1 upper left dental implant   • JOINT REPLACEMENT Left about     hip   • SHOULDER ARTHROSCOPY Right    • WISDOM TOOTH EXTRACTION         Social History:   Social History     Socioeconomic History   • Marital status: Single     Spouse name: Not on file   • Number of children: Not on file   • Years of education: Not on file   • Highest education level: Not on file   Occupational History   • Not on file   Social Needs   • Financial resource strain: Not on file   • Food insecurity:     Worry: Not on file     Inability: Not on file   • Transportation needs:     Medical: Not on file     Non-medical: Not on file   Tobacco Use   • Smoking status: Never Smoker   • Smokeless tobacco: Never Used   Substance and Sexual Activity   • Alcohol use: Yes     Alcohol/week: 5.0 standard drinks     Types: 5 Glasses of wine per week   • Drug use: No   • Sexual activity: Defer    Lifestyle   • Physical activity:     Days per week: Not on file     Minutes per session: Not on file   • Stress: Not on file   Relationships   • Social connections:     Talks on phone: Not on file     Gets together: Not on file     Attends Congregation service: Not on file     Active member of club or organization: Not on file     Attends meetings of clubs or organizations: Not on file     Relationship status: Not on file   • Intimate partner violence:     Fear of current or ex partner: Not on file     Emotionally abused: Not on file     Physically abused: Not on file     Forced sexual activity: Not on file   Other Topics Concern   • Not on file   Social History Narrative    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 No pertinent family history.    Allergies:   Hydromorphone    Current Medications:      Current Outpatient Medications:   •  albuterol HFA (PROAIR HFA) 90 mcg/actuation inhaler, as needed. , Disp: , Rfl:   •  azelastine (ASTELIN) 137 mcg (0.1 %) nasal spray, azelastine 137 mcg (0.1 %) nasal spray aerosol  USE 2 SPRAYS IN EACH NOSTRIL 2 TIMES DAILY AS NEEDED, Disp: , Rfl:   •  estradiol (ESTRACE) 0.01 % (0.1 mg/gram) vaginal cream, APPLY 0.5 G BY VAGINAL ROUTE 2 TIMES PER WEEK., Disp: , Rfl:   •  estradiol (ESTRACE) 0.5 mg tablet, Take 0.5 mg by mouth daily., Disp: , Rfl:   •  FLUoxetine (PROzac) 40 mg capsule, Take 40 mg by mouth every morning.  , Disp: , Rfl:   •  ibuprofen (ADVIL) 200 mg tablet, Take by mouth once daily as needed., Disp: , Rfl:   •  LORazepam (ATIVAN) 0.5 mg tablet, Take 0.5 mg by mouth as needed.  , Disp: , Rfl:   •  terbinafine (LamiSIL) 250 mg tablet, , Disp: , Rfl:   •  tretinoin (RETIN-A) 0.025 % cream, APPLY TO CLEAN DRY FACE NIGHTLY, Disp: , Rfl:   •  valACYclovir (VALTREX) 500 mg tablet, , Disp: , Rfl:   •  zolpidem (AMBIEN) 10 mg tablet, Take 10 mg by mouth as needed.  , Disp: , Rfl:   •  triamcinolone  (NASACORT) 55 mcg nasal inhaler, Administer 2 sprays into each nostril as needed.  , Disp: , Rfl:     Review of Systems  All 14 systems reviewed and the findings are not pertinent to the current problem.    Objective     Vital Signs  Vitals:    08/18/20 1043   BP: 120/70   Temp: 36.7 °C (98 °F)     Body mass index is 20.8 kg/m².      Physicial Exam    General Appearance:    Well developed.  Well nourished.  In no acute distress.  Patient was not observed to be obese.  Head:  Posture of the head was normal.  Neck:  External appearance of the neck was normal.  Trachea:  Showed no abnormalities.  Trachea was midline.  Eyes:  Extraocular movements were normal.  Pupils:  Reactive to light.  Not deformed.  Oral Cavity:  Mixed dentition was not observed.  Tongue was midline.    Other:  Musculoskeletal System:  No deficits or defects in the upper or lower extremities.    Functional Exam:   General/bilateral: Mobility was not limited.  Neurological:  No confusion was observed.  No disorientation was observed.  Speech: Normal.  Motor: No tremor was seen.  Balance: Normal.  Gait And Stance: Normal.  Psychiatric:  Mood:  Euthymic.  Affect:  Normal.  Skin:  General appearance was normal.  No jaundice.  Nails:  No clubbing of the fingernails.        Problem List Items Addressed This Visit     Anal cancer (CMS/HCC) - Primary     She is doing well almost two years out from treatment of an anal cancer.  She is going to have a colonoscopy at [facility] next month and will see me for another office exam six months after that.                     Aamir Reeves MD 8/18/2020 11:08 AM

## 2020-08-18 ENCOUNTER — OFFICE VISIT (OUTPATIENT)
Dept: SURGERY | Facility: CLINIC | Age: 65
End: 2020-08-18
Payer: COMMERCIAL

## 2020-08-18 VITALS
DIASTOLIC BLOOD PRESSURE: 70 MMHG | BODY MASS INDEX: 20.83 KG/M2 | WEIGHT: 125 LBS | TEMPERATURE: 98 F | SYSTOLIC BLOOD PRESSURE: 120 MMHG | HEIGHT: 65 IN

## 2020-08-18 DIAGNOSIS — C21.0 ANAL CANCER (CMS/HCC): Primary | ICD-10-CM

## 2020-08-18 PROCEDURE — 99213 OFFICE O/P EST LOW 20 MIN: CPT | Performed by: SURGERY

## 2020-08-18 RX ORDER — VALACYCLOVIR HYDROCHLORIDE 500 MG/1
TABLET, FILM COATED ORAL
COMMUNITY
Start: 2020-08-13

## 2020-08-18 RX ORDER — TERBINAFINE HYDROCHLORIDE 250 MG/1
TABLET ORAL
COMMUNITY
Start: 2020-08-03

## 2020-08-18 NOTE — LETTER
2020     Cabrera Alonso DO  [address]  [facility] [address]  [facility] [address]    Patient: Zuleika Barrera  YOB: 1955  Date of Visit: 2020      Dear Dr. Alonso:    Thank you for referring Zuleika Barrera to me for evaluation. Below are my notes for this consultation.    If you have questions, please do not hesitate to call me. I look forward to following your patient along with you.         Sincerely,        Aamir Reeves MD        CC: MD Lala Merino Philip Y, MD  2020 11:08 AM  Sign at close encounter      Chief Complaint:   Chief Complaint   Patient presents with   • Follow-up   .    HPI     Patient is a 64 y.o. female who presents with the following:  She is one year and nine months out from discovery of a large posterior anal cancer that was then treated with radiation and chemo therapy.   She definitely needs a colonoscopy in the very near future. She returns for a regular check up.     Pt taking low fodmap diet and this is helpoing a lot with distention and bloating -     Having OK function in her bottom    Medical History:   Past Medical History:   Diagnosis Date   • Anal cancer (CMS/HCC)     radiation and chemotx -last 2019; having GI bloating issues, difficult to determine if cause is dietary or from tx   • Anemia    • Arthritis    • Asthma     exercise induced   • PONV (postoperative nausea and vomiting)        Surgical History:   Past Surgical History:   Procedure Laterality Date   •  SECTION     • COLONOSCOPY     • COSMETIC SURGERY      abdominoplasty   • HIP ARTHROSCOPY Left    • HYSTERECTOMY      prolapse   • IMPLANT      1 upper left dental implant   • JOINT REPLACEMENT Left about     hip   • SHOULDER ARTHROSCOPY Right    • WISDOM TOOTH EXTRACTION         Social History:   Social History     Socioeconomic History   • Marital status: Single     Spouse name: Not on file   • Number of children: Not on file   • Years  of education: Not on file   • Highest education level: Not on file   Occupational History   • Not on file   Social Needs   • Financial resource strain: Not on file   • Food insecurity:     Worry: Not on file     Inability: Not on file   • Transportation needs:     Medical: Not on file     Non-medical: Not on file   Tobacco Use   • Smoking status: Never Smoker   • Smokeless tobacco: Never Used   Substance and Sexual Activity   • Alcohol use: Yes     Alcohol/week: 5.0 standard drinks     Types: 5 Glasses of wine per week   • Drug use: No   • Sexual activity: Defer   Lifestyle   • Physical activity:     Days per week: Not on file     Minutes per session: Not on file   • Stress: Not on file   Relationships   • Social connections:     Talks on phone: Not on file     Gets together: Not on file     Attends [employer] service: Not on file     Active member of club or organization: Not on file     Attends meetings of clubs or organizations: Not on file     Relationship status: Not on file   • Intimate partner violence:     Fear of current or ex partner: Not on file     Emotionally abused: Not on file     Physically abused: Not on file     Forced sexual activity: Not on file   Other Topics Concern   • Not on file   Social History Narrative    Lives alone in [place] home    , has own business       Family History:   History reviewed. No pertinent family history.    Allergies:   Hydromorphone    Current Medications:      Current Outpatient Medications:   •  albuterol HFA (PROAIR HFA) 90 mcg/actuation inhaler, as needed. , Disp: , Rfl:   •  azelastine (ASTELIN) 137 mcg (0.1 %) nasal spray, azelastine 137 mcg (0.1 %) nasal spray aerosol  USE 2 SPRAYS IN EACH NOSTRIL 2 TIMES DAILY AS NEEDED, Disp: , Rfl:   •  estradiol (ESTRACE) 0.01 % (0.1 mg/gram) vaginal cream, APPLY 0.5 G BY VAGINAL ROUTE 2 TIMES PER WEEK., Disp: , Rfl:   •  estradiol (ESTRACE) 0.5 mg tablet, Take 0.5 mg by mouth daily., Disp: , Rfl:   •   FLUoxetine (PROzac) 40 mg capsule, Take 40 mg by mouth every morning.  , Disp: , Rfl:   •  ibuprofen (ADVIL) 200 mg tablet, Take by mouth once daily as needed., Disp: , Rfl:   •  LORazepam (ATIVAN) 0.5 mg tablet, Take 0.5 mg by mouth as needed.  , Disp: , Rfl:   •  terbinafine (LamiSIL) 250 mg tablet, , Disp: , Rfl:   •  tretinoin (RETIN-A) 0.025 % cream, APPLY TO CLEAN DRY FACE NIGHTLY, Disp: , Rfl:   •  valACYclovir (VALTREX) 500 mg tablet, , Disp: , Rfl:   •  zolpidem (AMBIEN) 10 mg tablet, Take 10 mg by mouth as needed.  , Disp: , Rfl:   •  triamcinolone (NASACORT) 55 mcg nasal inhaler, Administer 2 sprays into each nostril as needed.  , Disp: , Rfl:     Review of Systems  All 14 systems reviewed and the findings are not pertinent to the current problem.    Objective     Vital Signs  Vitals:    08/18/20 1043   BP: 120/70   Temp: 36.7 °C (98 °F)     Body mass index is 20.8 kg/m².      Physicial Exam    General Appearance:    Well developed.  Well nourished.  In no acute distress.  Patient was not observed to be obese.  Head:  Posture of the head was normal.  Neck:  External appearance of the neck was normal.  Trachea:  Showed no abnormalities.  Trachea was midline.  Eyes:  Extraocular movements were normal.  Pupils:  Reactive to light.  Not deformed.  Oral Cavity:  Mixed dentition was not observed.  Tongue was midline.    Other:  Musculoskeletal System:  No deficits or defects in the upper or lower extremities.    Functional Exam:   General/bilateral: Mobility was not limited.  Neurological:  No confusion was observed.  No disorientation was observed.  Speech: Normal.  Motor: No tremor was seen.  Balance: Normal.  Gait And Stance: Normal.  Psychiatric:  Mood:  Euthymic.  Affect:  Normal.  Skin:  General appearance was normal.  No jaundice.  Nails:  No clubbing of the fingernails.        Problem List Items Addressed This Visit     Anal cancer (CMS/HCC) - Primary     She is doing well almost two years out from  treatment of an anal cancer.  She is going to have a colonoscopy at [facility] next month and will see me for another office exam six months after that.                     Aamir Reeves MD 8/18/2020 11:08 AM

## 2020-08-18 NOTE — ASSESSMENT & PLAN NOTE
She is doing well almost two years out from treatment of an anal cancer.  She is going to have a colonoscopy at [facility] next month and will see me for another office exam six months after that.

## 2021-04-15 DIAGNOSIS — Z23 ENCOUNTER FOR IMMUNIZATION: ICD-10-CM

## (undated) DEVICE — GLOVE SURG PROTEXIS PF 8

## (undated) DEVICE — BLADE SCALPEL #11

## (undated) DEVICE — PAD GROUND ELECTROSURGICAL W/CORD

## (undated) DEVICE — OINTMENT SURGILUBE 4OZ TUBE

## (undated) DEVICE — TIP SUCTION YANKAUER

## (undated) DEVICE — TUBE SUCTION 1/4INX20FT STERILE

## (undated) DEVICE — SOLN IRRIG STER WATER 1000ML

## (undated) DEVICE — COVER LIGHTHANDLE

## (undated) DEVICE — DRESSING SPONGE ALL GAUZE 4X4 STER 10PK

## (undated) DEVICE — TAPE DURAPORE 2IN

## (undated) DEVICE — SYRINGE DISP LUER-LOK 10 CC

## (undated) DEVICE — HEMOSTAT SURGICEL ABSORBABLE 4 X 8

## (undated) DEVICE — TAPE DURAPORE 3IN

## (undated) DEVICE — WIPE CAVILON FILM BARRIER NO STING

## (undated) DEVICE — ELECTROSURGICAL TIP CLEANER

## (undated) DEVICE — CATH IV 18GX1 1/4

## (undated) DEVICE — NEEDLE DISP HYPO 22GX1-1/2IN

## (undated) DEVICE — ***USE 57698*** SLEEVE FLOWTRON DVT CALF SINGLE USE

## (undated) DEVICE — SOLN IRRIG .9%SOD 1000ML

## (undated) DEVICE — GOWN SURGICAL REINFORCED X-LAR

## (undated) DEVICE — SYRINGE DISP LUER-LOK 20 CC

## (undated) DEVICE — SUTURE VICRYL 3-0 J416H SH UNDYED

## (undated) DEVICE — DRESSING ABD STER LGE 8X10

## (undated) DEVICE — Device

## (undated) DEVICE — PACK MLHS MINOR PROCEDURE

## (undated) DEVICE — TRAY WET SKIN PREP PREMIUM

## (undated) DEVICE — GLOVE SZ 8.5 LINER PROTEXIS PI BL

## (undated) DEVICE — PENCIL ESU HANDSWITCHING W/HOL